# Patient Record
Sex: FEMALE | Race: WHITE | NOT HISPANIC OR LATINO
[De-identification: names, ages, dates, MRNs, and addresses within clinical notes are randomized per-mention and may not be internally consistent; named-entity substitution may affect disease eponyms.]

---

## 2017-01-06 ENCOUNTER — APPOINTMENT (OUTPATIENT)
Dept: INTERNAL MEDICINE | Facility: CLINIC | Age: 22
End: 2017-01-06

## 2017-01-09 ENCOUNTER — RESULT CHARGE (OUTPATIENT)
Age: 22
End: 2017-01-09

## 2017-01-09 ENCOUNTER — EMERGENCY (EMERGENCY)
Facility: HOSPITAL | Age: 22
LOS: 1 days | Discharge: HOME | End: 2017-01-09
Attending: EMERGENCY MEDICINE | Admitting: EMERGENCY MEDICINE
Payer: COMMERCIAL

## 2017-01-09 ENCOUNTER — APPOINTMENT (OUTPATIENT)
Dept: INTERNAL MEDICINE | Facility: CLINIC | Age: 22
End: 2017-01-09

## 2017-01-09 ENCOUNTER — APPOINTMENT (OUTPATIENT)
Dept: OBGYN | Facility: CLINIC | Age: 22
End: 2017-01-09

## 2017-01-09 VITALS
TEMPERATURE: 98 F | DIASTOLIC BLOOD PRESSURE: 62 MMHG | HEART RATE: 69 BPM | RESPIRATION RATE: 16 BRPM | SYSTOLIC BLOOD PRESSURE: 118 MMHG | OXYGEN SATURATION: 100 %

## 2017-01-09 VITALS
BODY MASS INDEX: 23.56 KG/M2 | HEIGHT: 64 IN | SYSTOLIC BLOOD PRESSURE: 102 MMHG | DIASTOLIC BLOOD PRESSURE: 70 MMHG | WEIGHT: 138 LBS

## 2017-01-09 VITALS
SYSTOLIC BLOOD PRESSURE: 117 MMHG | OXYGEN SATURATION: 95 % | RESPIRATION RATE: 18 BRPM | TEMPERATURE: 98 F | HEART RATE: 72 BPM | DIASTOLIC BLOOD PRESSURE: 80 MMHG

## 2017-01-09 VITALS
DIASTOLIC BLOOD PRESSURE: 80 MMHG | WEIGHT: 138 LBS | BODY MASS INDEX: 23.56 KG/M2 | HEIGHT: 64 IN | SYSTOLIC BLOOD PRESSURE: 130 MMHG

## 2017-01-09 DIAGNOSIS — Z86.19 PERSONAL HISTORY OF OTHER INFECTIOUS AND PARASITIC DISEASES: ICD-10-CM

## 2017-01-09 DIAGNOSIS — R11.0 NAUSEA: ICD-10-CM

## 2017-01-09 DIAGNOSIS — Z88.1 ALLERGY STATUS TO OTHER ANTIBIOTIC AGENTS STATUS: ICD-10-CM

## 2017-01-09 DIAGNOSIS — N83.20 UNSPECIFIED OVARIAN CYSTS: Chronic | ICD-10-CM

## 2017-01-09 DIAGNOSIS — R10.11 RIGHT UPPER QUADRANT PAIN: ICD-10-CM

## 2017-01-09 DIAGNOSIS — A41.9 SEPSIS, UNSPECIFIED ORGANISM: ICD-10-CM

## 2017-01-09 DIAGNOSIS — Z01.419 ENCOUNTER FOR GYNECOLOGICAL EXAMINATION (GENERAL) (ROUTINE) W/OUT ABNORMAL FINDINGS: ICD-10-CM

## 2017-01-09 DIAGNOSIS — R10.13 EPIGASTRIC PAIN: ICD-10-CM

## 2017-01-09 DIAGNOSIS — O21.0 MILD HYPEREMESIS GRAVIDARUM: ICD-10-CM

## 2017-01-09 DIAGNOSIS — Z87.891 PERSONAL HISTORY OF NICOTINE DEPENDENCE: ICD-10-CM

## 2017-01-09 DIAGNOSIS — N89.8 OTHER SPECIFIED NONINFLAMMATORY DISORDERS OF VAGINA: ICD-10-CM

## 2017-01-09 DIAGNOSIS — F15.90 OTHER STIMULANT USE, UNSPECIFIED, UNCOMPLICATED: ICD-10-CM

## 2017-01-09 DIAGNOSIS — N39.0 SEPSIS, UNSPECIFIED ORGANISM: ICD-10-CM

## 2017-01-09 DIAGNOSIS — Z3A.01 LESS THAN 8 WEEKS GESTATION OF PREGNANCY: ICD-10-CM

## 2017-01-09 LAB
ALBUMIN SERPL ELPH-MCNC: 5 G/DL — SIGNIFICANT CHANGE UP (ref 3.3–5)
ALP SERPL-CCNC: 57 U/L — SIGNIFICANT CHANGE UP (ref 40–120)
ALT FLD-CCNC: 25 U/L RC — SIGNIFICANT CHANGE UP (ref 10–45)
ANION GAP SERPL CALC-SCNC: 22 MMOL/L — HIGH (ref 5–17)
APPEARANCE UR: ABNORMAL
AST SERPL-CCNC: 28 U/L — SIGNIFICANT CHANGE UP (ref 10–40)
BACTERIA # UR AUTO: ABNORMAL /HPF
BASOPHILS # BLD AUTO: 0 K/UL — SIGNIFICANT CHANGE UP (ref 0–0.2)
BASOPHILS NFR BLD AUTO: 0.1 % — SIGNIFICANT CHANGE UP (ref 0–2)
BILIRUB SERPL-MCNC: 0.8 MG/DL — SIGNIFICANT CHANGE UP (ref 0.2–1.2)
BILIRUB UR QL STRIP: NORMAL
BILIRUB UR-MCNC: NEGATIVE — SIGNIFICANT CHANGE UP
BLD GP AB SCN SERPL QL: NEGATIVE — SIGNIFICANT CHANGE UP
BUN SERPL-MCNC: 21 MG/DL — SIGNIFICANT CHANGE UP (ref 7–23)
CALCIUM SERPL-MCNC: 10.6 MG/DL — HIGH (ref 8.4–10.5)
CHLORIDE SERPL-SCNC: 97 MMOL/L — SIGNIFICANT CHANGE UP (ref 96–108)
CLARITY UR: CLEAR
CO2 SERPL-SCNC: 18 MMOL/L — LOW (ref 22–31)
COLLECTION METHOD: NORMAL
COLOR SPEC: YELLOW — SIGNIFICANT CHANGE UP
CREAT SERPL-MCNC: 0.83 MG/DL — SIGNIFICANT CHANGE UP (ref 0.5–1.3)
DIFF PNL FLD: NEGATIVE — SIGNIFICANT CHANGE UP
EOSINOPHIL # BLD AUTO: 0 K/UL — SIGNIFICANT CHANGE UP (ref 0–0.5)
EOSINOPHIL NFR BLD AUTO: 0.3 % — SIGNIFICANT CHANGE UP (ref 0–6)
EPI CELLS # UR: SIGNIFICANT CHANGE UP /HPF
GAS PNL BLDV: SIGNIFICANT CHANGE UP
GLUCOSE SERPL-MCNC: 92 MG/DL — SIGNIFICANT CHANGE UP (ref 70–99)
GLUCOSE UR QL: NEGATIVE — SIGNIFICANT CHANGE UP
GLUCOSE UR-MCNC: NORMAL
HCG UR QL: 1 EU/DL
HCG UR QL: POSITIVE
HCT VFR BLD CALC: 43.1 % — SIGNIFICANT CHANGE UP (ref 34.5–45)
HGB BLD-MCNC: 15.2 G/DL — SIGNIFICANT CHANGE UP (ref 11.5–15.5)
HGB UR QL STRIP.AUTO: NORMAL
KETONES UR-MCNC: 40
KETONES UR-MCNC: ABNORMAL
LEUKOCYTE ESTERASE UR QL STRIP: NORMAL
LEUKOCYTE ESTERASE UR-ACNC: NEGATIVE — SIGNIFICANT CHANGE UP
LIDOCAIN IGE QN: 35 U/L — SIGNIFICANT CHANGE UP (ref 7–60)
LYMPHOCYTES # BLD AUTO: 1.2 K/UL — SIGNIFICANT CHANGE UP (ref 1–3.3)
LYMPHOCYTES # BLD AUTO: 8.4 % — LOW (ref 13–44)
MCHC RBC-ENTMCNC: 31.9 PG — SIGNIFICANT CHANGE UP (ref 27–34)
MCHC RBC-ENTMCNC: 35.3 GM/DL — SIGNIFICANT CHANGE UP (ref 32–36)
MCV RBC AUTO: 90.4 FL — SIGNIFICANT CHANGE UP (ref 80–100)
MONOCYTES # BLD AUTO: 1.1 K/UL — HIGH (ref 0–0.9)
MONOCYTES NFR BLD AUTO: 7.8 % — SIGNIFICANT CHANGE UP (ref 2–14)
NEUTROPHILS # BLD AUTO: 12.2 K/UL — HIGH (ref 1.8–7.4)
NEUTROPHILS NFR BLD AUTO: 83.4 % — HIGH (ref 43–77)
NITRITE UR QL STRIP: NORMAL
NITRITE UR-MCNC: NEGATIVE — SIGNIFICANT CHANGE UP
PH UR STRIP: 6
PH UR: 6 — SIGNIFICANT CHANGE UP (ref 4.8–8)
PLATELET # BLD AUTO: 285 K/UL — SIGNIFICANT CHANGE UP (ref 150–400)
POTASSIUM SERPL-MCNC: 3.5 MMOL/L — SIGNIFICANT CHANGE UP (ref 3.5–5.3)
POTASSIUM SERPL-SCNC: 3.5 MMOL/L — SIGNIFICANT CHANGE UP (ref 3.5–5.3)
PROT SERPL-MCNC: 8.9 G/DL — HIGH (ref 6–8.3)
PROT UR STRIP-MCNC: 30
PROT UR-MCNC: 30 MG/DL
QUALITY CONTROL: YES
RBC # BLD: 4.77 M/UL — SIGNIFICANT CHANGE UP (ref 3.8–5.2)
RBC # FLD: 12.6 % — SIGNIFICANT CHANGE UP (ref 10.3–14.5)
RBC CASTS # UR COMP ASSIST: SIGNIFICANT CHANGE UP /HPF (ref 0–2)
RH IG SCN BLD-IMP: POSITIVE — SIGNIFICANT CHANGE UP
SODIUM SERPL-SCNC: 137 MMOL/L — SIGNIFICANT CHANGE UP (ref 135–145)
SP GR SPEC: >1.03 — HIGH (ref 1.01–1.02)
SP GR UR STRIP: 1.02
UROBILINOGEN FLD QL: 1
WBC # BLD: 14.7 K/UL — HIGH (ref 3.8–10.5)
WBC # FLD AUTO: 14.7 K/UL — HIGH (ref 3.8–10.5)
WBC UR QL: SIGNIFICANT CHANGE UP /HPF (ref 0–5)

## 2017-01-09 PROCEDURE — 99284 EMERGENCY DEPT VISIT MOD MDM: CPT

## 2017-01-09 PROCEDURE — 87086 URINE CULTURE/COLONY COUNT: CPT

## 2017-01-09 PROCEDURE — 82435 ASSAY OF BLOOD CHLORIDE: CPT

## 2017-01-09 PROCEDURE — 84132 ASSAY OF SERUM POTASSIUM: CPT

## 2017-01-09 PROCEDURE — 86900 BLOOD TYPING SEROLOGIC ABO: CPT

## 2017-01-09 PROCEDURE — 96375 TX/PRO/DX INJ NEW DRUG ADDON: CPT

## 2017-01-09 PROCEDURE — 81001 URINALYSIS AUTO W/SCOPE: CPT

## 2017-01-09 PROCEDURE — 80053 COMPREHEN METABOLIC PANEL: CPT

## 2017-01-09 PROCEDURE — 96374 THER/PROPH/DIAG INJ IV PUSH: CPT

## 2017-01-09 PROCEDURE — 83690 ASSAY OF LIPASE: CPT

## 2017-01-09 PROCEDURE — 85014 HEMATOCRIT: CPT

## 2017-01-09 PROCEDURE — 86901 BLOOD TYPING SEROLOGIC RH(D): CPT

## 2017-01-09 PROCEDURE — 99284 EMERGENCY DEPT VISIT MOD MDM: CPT | Mod: 25

## 2017-01-09 PROCEDURE — 86850 RBC ANTIBODY SCREEN: CPT

## 2017-01-09 PROCEDURE — 83605 ASSAY OF LACTIC ACID: CPT

## 2017-01-09 PROCEDURE — 85027 COMPLETE CBC AUTOMATED: CPT

## 2017-01-09 PROCEDURE — 82330 ASSAY OF CALCIUM: CPT

## 2017-01-09 PROCEDURE — 87186 SC STD MICRODIL/AGAR DIL: CPT

## 2017-01-09 PROCEDURE — 82947 ASSAY GLUCOSE BLOOD QUANT: CPT

## 2017-01-09 PROCEDURE — 84295 ASSAY OF SERUM SODIUM: CPT

## 2017-01-09 PROCEDURE — 82803 BLOOD GASES ANY COMBINATION: CPT

## 2017-01-09 RX ORDER — SODIUM CHLORIDE 9 MG/ML
2000 INJECTION INTRAMUSCULAR; INTRAVENOUS; SUBCUTANEOUS ONCE
Qty: 0 | Refills: 0 | Status: COMPLETED | OUTPATIENT
Start: 2017-01-09 | End: 2017-01-09

## 2017-01-09 RX ORDER — ONDANSETRON 8 MG/1
1 TABLET, FILM COATED ORAL
Qty: 15 | Refills: 0 | OUTPATIENT
Start: 2017-01-09 | End: 2017-01-14

## 2017-01-09 RX ORDER — NITROFURANTOIN MACROCRYSTAL 50 MG
100 CAPSULE ORAL ONCE
Qty: 0 | Refills: 0 | Status: COMPLETED | OUTPATIENT
Start: 2017-01-09 | End: 2017-01-09

## 2017-01-09 RX ORDER — METOCLOPRAMIDE HCL 10 MG
10 TABLET ORAL ONCE
Qty: 0 | Refills: 0 | Status: DISCONTINUED | OUTPATIENT
Start: 2017-01-09 | End: 2017-01-09

## 2017-01-09 RX ORDER — METOCLOPRAMIDE HCL 10 MG
10 TABLET ORAL ONCE
Qty: 0 | Refills: 0 | Status: COMPLETED | OUTPATIENT
Start: 2017-01-09 | End: 2017-01-09

## 2017-01-09 RX ORDER — PYRIDOXINE HCL (VITAMIN B6) 100 MG
100 TABLET ORAL ONCE
Qty: 0 | Refills: 0 | Status: COMPLETED | OUTPATIENT
Start: 2017-01-09 | End: 2017-01-09

## 2017-01-09 RX ORDER — NITROFURANTOIN MACROCRYSTAL 50 MG
1 CAPSULE ORAL
Qty: 28 | Refills: 0 | OUTPATIENT
Start: 2017-01-09 | End: 2017-01-16

## 2017-01-09 RX ORDER — DIPHENHYDRAMINE HCL 50 MG
50 CAPSULE ORAL ONCE
Qty: 0 | Refills: 0 | Status: COMPLETED | OUTPATIENT
Start: 2017-01-09 | End: 2017-01-09

## 2017-01-09 RX ADMIN — Medication 50 MILLIGRAM(S): at 15:13

## 2017-01-09 RX ADMIN — Medication 100 MILLIGRAM(S): at 16:11

## 2017-01-09 RX ADMIN — SODIUM CHLORIDE 2000 MILLILITER(S): 9 INJECTION INTRAMUSCULAR; INTRAVENOUS; SUBCUTANEOUS at 14:48

## 2017-01-09 RX ADMIN — Medication 100 MILLIGRAM(S): at 18:04

## 2017-01-09 RX ADMIN — Medication 10 MILLIGRAM(S): at 14:59

## 2017-01-09 NOTE — ED ADULT NURSE NOTE - OBJECTIVE STATEMENT
22 y/o F, reported to ED from home. A&Ox3, c/o vomiting x1 week. Pt is 5-6 weeks pregnant and has been vomiting for the past week. Pt went to her OBGYN today and was told to go to the ED to get hydrated. Pt reports that she has been feeling weak and unable to tolerated PO. Pt reports that she vomited 2x today but hasn't eaten anything in a few days. Pt reports that even water upsets her stomach. Pt denies fever or chills. Pt denies dizziness or SOB. Pt denies pain at this time. Pt is up to date with immunizations and is being followed by OBGYN. Mother at bedside, will continue to monitor pt.

## 2017-01-09 NOTE — ED PROVIDER NOTE - CARE PLAN
Principal Discharge DX:	Hyperemesis gravidarum Principal Discharge DX:	Hyperemesis gravidarum  Instructions for follow-up, activity and diet:	1) Please return to the ED should you have any new or worsening symptoms, worsening pain, develop fever, chills, worsening nausea or vomiting  2) Please follow up with your primary care physician in 1-2 days   3) Please  your macrobid from your pharmacy - please take 1 capsule 4 times a day for 7 days. Please complete the entire prescription. Please also  zofran. Please take 1 tablet under your tongue every 8 hours as needed for nausea.  4) A urine culture is pending in the lab - please let you primary physician know to follow up with this

## 2017-01-09 NOTE — ED PROCEDURE NOTE - GENERAL PROCEDURE NAME
POCUS: Emergency Department Focused Ultrasound performed at patient's bedside.  The study was of limited quality due to bowel gas and the staging of pregnancy. Will cancel and patient with documented FHR prior to arrival today. Dr. JOE Marsh informed.

## 2017-01-09 NOTE — ED PROVIDER NOTE - PLAN OF CARE
1) Please return to the ED should you have any new or worsening symptoms, worsening pain, develop fever, chills, worsening nausea or vomiting  2) Please follow up with your primary care physician in 1-2 days   3) Please  your macrobid from your pharmacy - please take 1 capsule 4 times a day for 7 days. Please complete the entire prescription. Please also  zofran. Please take 1 tablet under your tongue every 8 hours as needed for nausea.  4) A urine culture is pending in the lab - please let you primary physician know to follow up with this

## 2017-01-09 NOTE — ED PEDIATRIC NURSE REASSESSMENT NOTE - NS ED NURSE REASSESS COMMENT FT2
RN called the pharmacist and asked to send pyridoxine medication to Peds ED. Will continue to monitor pt. Parents at bedside.

## 2017-01-09 NOTE — ED PROVIDER NOTE - OBJECTIVE STATEMENT
22 y/o female  at 6 weeks by LMP presents with emesis and abdominal pain for 1 week. Pregnancy was not planned, but patient is keeping baby. Patient states she has not been able to eat or drink 2/2 to the nausea and vomiting. Patient was at an OB Clinic today where a transvaginal ultrasound was performed and verified an IUP, and she was told to come to the ED for dehydration and nausea control. Denies any HA, F/c, vision changes, Cp, SOB, dysuria, hematuria. Reports 1 episode of very minimal spotting 5 weeks ago when she fist found she was pregnant, but none since then. No hx of abdominal surgeries. Patient has not been taking any medication for nausea at home.

## 2017-01-09 NOTE — ED PROVIDER NOTE - ABDOMINAL EXAM
nondistended/tender.../mild epigastric tenderness and right upper quadrant tenderness - negative murphys - no guarding or rebound/soft

## 2017-01-09 NOTE — ED PROVIDER NOTE - PROGRESS NOTE DETAILS
Dr. Marsh spoke with Dr. Tien Padilla at 2:30 pm. States that today in clinic a transvaginal US was performed which did not display any subchorionic hemorrhage, as patient had endorsed 1 episode of spotting during the first week of her pregnancy - Does not need repeat transvaginal, but is requesting a type and screen - okay with zofran for nausea and B6 Billy: many bacteria in urine - will d/c home with macro bid- patient has been tolerating PO - safe to d/c home with zofran rx

## 2017-01-09 NOTE — ED PROVIDER NOTE - MEDICAL DECISION MAKING DETAILS
Billy resident: 22 y/o  at 6 weeks by LMP presents with hyperemssis for 1 week - has not taken any anti-emetics for the nausea - was at OB  today (Dr. Padilla) who performed TVUS - no subchorionic - states no need for additional TVUS - would like us to rx her dehydration, type and screen, and rx nausea

## 2017-01-09 NOTE — ED ADULT NURSE REASSESSMENT NOTE - NS ED NURSE REASSESS COMMENT FT1
Pt receiving 2nd L of IV fluids. Pt reports increased nausea after drinking some apple juice. VSS. Will continue to monitor pt.

## 2017-01-09 NOTE — ED PROVIDER NOTE - ATTENDING CONTRIBUTION TO CARE
21f no pmhx  LMP 6 wks ago, IUP confirmed today sent from clinic for hyperemesis. states vomiting for ~1 wk, unable to tolerate PO. was seen at clinic today. TVUS reported to be wnl, no subchor visualized (reported 1 episode of very minimal spotting 5 weeks ago). on PE, VSS, in moderate distress, epigastrium TTP, no Warner's. will obtain basics, UA/UCx, give reglan/benadryl, reassess

## 2017-01-11 ENCOUNTER — TRANSCRIPTION ENCOUNTER (OUTPATIENT)
Age: 22
End: 2017-01-11

## 2017-01-11 LAB
-  AMIKACIN: SIGNIFICANT CHANGE UP
-  AMPICILLIN/SULBACTAM: SIGNIFICANT CHANGE UP
-  AMPICILLIN: SIGNIFICANT CHANGE UP
-  AZTREONAM: SIGNIFICANT CHANGE UP
-  CEFAZOLIN: SIGNIFICANT CHANGE UP
-  CEFEPIME: SIGNIFICANT CHANGE UP
-  CEFOXITIN: SIGNIFICANT CHANGE UP
-  CEFTAZIDIME: SIGNIFICANT CHANGE UP
-  CEFTRIAXONE: SIGNIFICANT CHANGE UP
-  CIPROFLOXACIN: SIGNIFICANT CHANGE UP
-  ERTAPENEM: SIGNIFICANT CHANGE UP
-  GENTAMICIN: SIGNIFICANT CHANGE UP
-  IMIPENEM: SIGNIFICANT CHANGE UP
-  LEVOFLOXACIN: SIGNIFICANT CHANGE UP
-  MEROPENEM: SIGNIFICANT CHANGE UP
-  NITROFURANTOIN: SIGNIFICANT CHANGE UP
-  PIPERACILLIN/TAZOBACTAM: SIGNIFICANT CHANGE UP
-  TOBRAMYCIN: SIGNIFICANT CHANGE UP
-  TRIMETHOPRIM/SULFAMETHOXAZOLE: SIGNIFICANT CHANGE UP
C TRACH DNA SPEC QL NAA+PROBE: NORMAL
C TRACH RRNA SPEC QL NAA+PROBE: NORMAL
CULTURE RESULTS: SIGNIFICANT CHANGE UP
METHOD TYPE: SIGNIFICANT CHANGE UP
N GONORRHOEA DNA SPEC QL NAA+PROBE: NORMAL
N GONORRHOEA RRNA SPEC QL NAA+PROBE: NORMAL
ORGANISM # SPEC MICROSCOPIC CNT: SIGNIFICANT CHANGE UP
ORGANISM # SPEC MICROSCOPIC CNT: SIGNIFICANT CHANGE UP
SOURCE AMPLIFICATION: NORMAL
SPECIMEN SOURCE: SIGNIFICANT CHANGE UP

## 2017-01-11 NOTE — ED POST DISCHARGE NOTE - OTHER COMMUNICATION
pregnant pt on appropriate care with macrobid. pt had called for results, gave results to pt and to pts father at pts request. father wants to know sensitivity results when available, gave him the call back number to call in about 3 days.

## 2017-01-12 ENCOUNTER — EMERGENCY (EMERGENCY)
Facility: HOSPITAL | Age: 22
LOS: 1 days | Discharge: HOME | End: 2017-01-12
Attending: EMERGENCY MEDICINE | Admitting: EMERGENCY MEDICINE
Payer: COMMERCIAL

## 2017-01-12 ENCOUNTER — APPOINTMENT (OUTPATIENT)
Dept: OBGYN | Facility: CLINIC | Age: 22
End: 2017-01-12

## 2017-01-12 VITALS
SYSTOLIC BLOOD PRESSURE: 104 MMHG | DIASTOLIC BLOOD PRESSURE: 71 MMHG | RESPIRATION RATE: 17 BRPM | OXYGEN SATURATION: 100 % | TEMPERATURE: 98 F | HEART RATE: 58 BPM

## 2017-01-12 VITALS
DIASTOLIC BLOOD PRESSURE: 64 MMHG | TEMPERATURE: 98 F | SYSTOLIC BLOOD PRESSURE: 106 MMHG | HEART RATE: 70 BPM | OXYGEN SATURATION: 100 % | RESPIRATION RATE: 18 BRPM

## 2017-01-12 DIAGNOSIS — Z88.8 ALLERGY STATUS TO OTHER DRUGS, MEDICAMENTS AND BIOLOGICAL SUBSTANCES STATUS: ICD-10-CM

## 2017-01-12 DIAGNOSIS — N83.20 UNSPECIFIED OVARIAN CYSTS: Chronic | ICD-10-CM

## 2017-01-12 DIAGNOSIS — O21.0 MILD HYPEREMESIS GRAVIDARUM: ICD-10-CM

## 2017-01-12 DIAGNOSIS — Z3A.01 LESS THAN 8 WEEKS GESTATION OF PREGNANCY: ICD-10-CM

## 2017-01-12 LAB
ALBUMIN SERPL ELPH-MCNC: 4.6 G/DL — SIGNIFICANT CHANGE UP (ref 3.3–5)
ALP SERPL-CCNC: 52 U/L — SIGNIFICANT CHANGE UP (ref 40–120)
ALT FLD-CCNC: 25 U/L RC — SIGNIFICANT CHANGE UP (ref 10–45)
ANION GAP SERPL CALC-SCNC: 18 MMOL/L — HIGH (ref 5–17)
APPEARANCE UR: ABNORMAL
AST SERPL-CCNC: 23 U/L — SIGNIFICANT CHANGE UP (ref 10–40)
BASOPHILS # BLD AUTO: 0 K/UL — SIGNIFICANT CHANGE UP (ref 0–0.2)
BASOPHILS NFR BLD AUTO: 0.3 % — SIGNIFICANT CHANGE UP (ref 0–2)
BILIRUB SERPL-MCNC: 0.5 MG/DL — SIGNIFICANT CHANGE UP (ref 0.2–1.2)
BILIRUB UR-MCNC: NEGATIVE — SIGNIFICANT CHANGE UP
BUN SERPL-MCNC: 12 MG/DL — SIGNIFICANT CHANGE UP (ref 7–23)
CALCIUM SERPL-MCNC: 9.8 MG/DL — SIGNIFICANT CHANGE UP (ref 8.4–10.5)
CHLORIDE SERPL-SCNC: 101 MMOL/L — SIGNIFICANT CHANGE UP (ref 96–108)
CO2 SERPL-SCNC: 19 MMOL/L — LOW (ref 22–31)
COLOR SPEC: YELLOW — SIGNIFICANT CHANGE UP
CREAT SERPL-MCNC: 0.67 MG/DL — SIGNIFICANT CHANGE UP (ref 0.5–1.3)
DIFF PNL FLD: NEGATIVE — SIGNIFICANT CHANGE UP
EOSINOPHIL # BLD AUTO: 0 K/UL — SIGNIFICANT CHANGE UP (ref 0–0.5)
EOSINOPHIL NFR BLD AUTO: 0.3 % — SIGNIFICANT CHANGE UP (ref 0–6)
EPI CELLS # UR: SIGNIFICANT CHANGE UP /HPF
GLUCOSE SERPL-MCNC: 80 MG/DL — SIGNIFICANT CHANGE UP (ref 70–99)
GLUCOSE UR QL: NEGATIVE — SIGNIFICANT CHANGE UP
HCG SERPL-ACNC: SIGNIFICANT CHANGE UP MIU/ML
HCT VFR BLD CALC: 41.2 % — SIGNIFICANT CHANGE UP (ref 34.5–45)
HGB BLD-MCNC: 14.4 G/DL — SIGNIFICANT CHANGE UP (ref 11.5–15.5)
KETONES UR-MCNC: ABNORMAL
LEUKOCYTE ESTERASE UR-ACNC: NEGATIVE — SIGNIFICANT CHANGE UP
LIDOCAIN IGE QN: 32 U/L — SIGNIFICANT CHANGE UP (ref 7–60)
LYMPHOCYTES # BLD AUTO: 1.6 K/UL — SIGNIFICANT CHANGE UP (ref 1–3.3)
LYMPHOCYTES # BLD AUTO: 12.6 % — LOW (ref 13–44)
MCHC RBC-ENTMCNC: 32.1 PG — SIGNIFICANT CHANGE UP (ref 27–34)
MCHC RBC-ENTMCNC: 34.9 GM/DL — SIGNIFICANT CHANGE UP (ref 32–36)
MCV RBC AUTO: 91.9 FL — SIGNIFICANT CHANGE UP (ref 80–100)
MONOCYTES # BLD AUTO: 0.8 K/UL — SIGNIFICANT CHANGE UP (ref 0–0.9)
MONOCYTES NFR BLD AUTO: 6.1 % — SIGNIFICANT CHANGE UP (ref 2–14)
NEUTROPHILS # BLD AUTO: 10.6 K/UL — HIGH (ref 1.8–7.4)
NEUTROPHILS NFR BLD AUTO: 80.7 % — HIGH (ref 43–77)
NITRITE UR-MCNC: NEGATIVE — SIGNIFICANT CHANGE UP
PH UR: 6.5 — SIGNIFICANT CHANGE UP (ref 4.8–8)
PLATELET # BLD AUTO: 223 K/UL — SIGNIFICANT CHANGE UP (ref 150–400)
POTASSIUM SERPL-MCNC: 3.4 MMOL/L — LOW (ref 3.5–5.3)
POTASSIUM SERPL-SCNC: 3.4 MMOL/L — LOW (ref 3.5–5.3)
PROT SERPL-MCNC: 8.3 G/DL — SIGNIFICANT CHANGE UP (ref 6–8.3)
PROT UR-MCNC: 100 MG/DL
RBC # BLD: 4.48 M/UL — SIGNIFICANT CHANGE UP (ref 3.8–5.2)
RBC # FLD: 13.1 % — SIGNIFICANT CHANGE UP (ref 10.3–14.5)
SODIUM SERPL-SCNC: 138 MMOL/L — SIGNIFICANT CHANGE UP (ref 135–145)
SP GR SPEC: >1.03 — HIGH (ref 1.01–1.02)
UROBILINOGEN FLD QL: 1
WBC # BLD: 13.1 K/UL — HIGH (ref 3.8–10.5)
WBC # FLD AUTO: 13.1 K/UL — HIGH (ref 3.8–10.5)
WBC UR QL: SIGNIFICANT CHANGE UP /HPF (ref 0–5)

## 2017-01-12 PROCEDURE — 99284 EMERGENCY DEPT VISIT MOD MDM: CPT

## 2017-01-12 PROCEDURE — 85027 COMPLETE CBC AUTOMATED: CPT

## 2017-01-12 PROCEDURE — 81001 URINALYSIS AUTO W/SCOPE: CPT

## 2017-01-12 PROCEDURE — 80053 COMPREHEN METABOLIC PANEL: CPT

## 2017-01-12 PROCEDURE — 83690 ASSAY OF LIPASE: CPT

## 2017-01-12 PROCEDURE — 96374 THER/PROPH/DIAG INJ IV PUSH: CPT

## 2017-01-12 PROCEDURE — 96375 TX/PRO/DX INJ NEW DRUG ADDON: CPT

## 2017-01-12 PROCEDURE — 84702 CHORIONIC GONADOTROPIN TEST: CPT

## 2017-01-12 PROCEDURE — 99284 EMERGENCY DEPT VISIT MOD MDM: CPT | Mod: 25

## 2017-01-12 RX ORDER — POTASSIUM CHLORIDE 20 MEQ
20 PACKET (EA) ORAL ONCE
Qty: 0 | Refills: 0 | Status: COMPLETED | OUTPATIENT
Start: 2017-01-12 | End: 2017-01-12

## 2017-01-12 RX ORDER — PYRIDOXINE HCL (VITAMIN B6) 100 MG
50 TABLET ORAL ONCE
Qty: 0 | Refills: 0 | Status: COMPLETED | OUTPATIENT
Start: 2017-01-12 | End: 2017-01-12

## 2017-01-12 RX ORDER — ONDANSETRON 8 MG/1
4 TABLET, FILM COATED ORAL ONCE
Qty: 0 | Refills: 0 | Status: COMPLETED | OUTPATIENT
Start: 2017-01-12 | End: 2017-01-12

## 2017-01-12 RX ORDER — DOXYLAMINE SUCCINATE AND PYRIDOXINE HYDROCHLORIDE, DELAYED RELEASE TABLETS 10 MG/10 MG 10; 10 MG/1; MG/1
1 TABLET, DELAYED RELEASE ORAL
Qty: 60 | Refills: 0 | OUTPATIENT
Start: 2017-01-12 | End: 2017-02-11

## 2017-01-12 RX ORDER — NITROFURANTOIN MACROCRYSTAL 50 MG
100 CAPSULE ORAL ONCE
Qty: 0 | Refills: 0 | Status: COMPLETED | OUTPATIENT
Start: 2017-01-12 | End: 2017-01-12

## 2017-01-12 RX ORDER — DIPHENHYDRAMINE HCL 50 MG
50 CAPSULE ORAL ONCE
Qty: 0 | Refills: 0 | Status: COMPLETED | OUTPATIENT
Start: 2017-01-12 | End: 2017-01-12

## 2017-01-12 RX ORDER — POTASSIUM CHLORIDE 20 MEQ
10 PACKET (EA) ORAL
Qty: 0 | Refills: 0 | Status: DISCONTINUED | OUTPATIENT
Start: 2017-01-12 | End: 2017-01-12

## 2017-01-12 RX ORDER — SODIUM CHLORIDE 9 MG/ML
1000 INJECTION INTRAMUSCULAR; INTRAVENOUS; SUBCUTANEOUS ONCE
Qty: 0 | Refills: 0 | Status: COMPLETED | OUTPATIENT
Start: 2017-01-12 | End: 2017-01-12

## 2017-01-12 RX ADMIN — Medication 20 MILLIEQUIVALENT(S): at 15:26

## 2017-01-12 RX ADMIN — Medication 50 MILLIGRAM(S): at 12:49

## 2017-01-12 RX ADMIN — SODIUM CHLORIDE 1000 MILLILITER(S): 9 INJECTION INTRAMUSCULAR; INTRAVENOUS; SUBCUTANEOUS at 11:24

## 2017-01-12 RX ADMIN — ONDANSETRON 4 MILLIGRAM(S): 8 TABLET, FILM COATED ORAL at 11:27

## 2017-01-12 RX ADMIN — Medication 50 MILLIGRAM(S): at 12:39

## 2017-01-12 RX ADMIN — Medication 100 MILLIGRAM(S): at 15:26

## 2017-01-12 RX ADMIN — Medication 100 MILLIEQUIVALENT(S): at 13:17

## 2017-01-12 NOTE — ED PROVIDER NOTE - OBJECTIVE STATEMENT
22yo F  6wks with hyperemesis seen in ED 2 days ago for same sx, was at OB clinic today sent to ED. lost 19pounds last week. no fever/chills. dx with UTI unable to take medication, zofran not working. no cramping, vaginal bleeding/spotting or discharge.

## 2017-01-12 NOTE — ED PROVIDER NOTE - MEDICAL DECISION MAKING DETAILS
ATTD: first trim preg with vomiting / dehydration - check labs, check US FHR, ivf, antiemetics. re eval for dispo

## 2017-01-12 NOTE — ED PROVIDER NOTE - PROGRESS NOTE DETAILS
FHR 130s by doppler -Ariana DO nausea resolved, has not vomited, will PO challenge -Slowey DO Dr. Mason: no need for GYN/OB consult since improved, D/C on diclegis -Slowey DO

## 2017-01-12 NOTE — ED PROVIDER NOTE - CARE PLAN
Principal Discharge DX:	Hyperemesis gravidarum  Instructions for follow-up, activity and diet:	1. Return to ED for worsening, progressive or any other concerning symptoms   2. Follow up with your primary care doctor in 2-3days   3. Take diclegis twice a day   4. Take 4mg Zofran every 6-8 hours as needed for nausea  5. Continue your macrobid as prescribed

## 2017-01-12 NOTE — ED PROVIDER NOTE - PHYSICAL EXAMINATION
Gen: NAD, AOx3  Head: NCAT  HEENT: PERRL, oral mucosa moist, normal conjunctiva, neck supple  Lung: CTAB, no respiratory distress  CV: rrr, no murmur, Normal perfusion  Abd: soft, NTND  MSK: No edema, no visible deformities  Neuro: No focal neurologic deficits  Skin: No rash   Psych: normal affect

## 2017-01-12 NOTE — ED PROVIDER NOTE - ATTENDING CONTRIBUTION TO CARE
20 y/o female  at 6 weeks by LMP (cant recall exact date) presents with her parents for concern of continued vomiting and unable to tolerate PO. was diagnosed with UTI as well and treated with zofran and macrobid. Was seen earlier at OB clinic and sent to ER for further eval. Had TV US with IUP and heart rate. her Gyn is Dr. Sun Mason and I spoke with earlier.   Gen. no acute distress, Non toxic   HEENT: EOMI, mmm,   Lungs: CTAB/L no C/ W /R   CVS: S1S2   Abd; Soft non tender, non distended   Ext: no edema   Neuro AAOx3 non focal clear speech

## 2017-01-12 NOTE — ED ADULT NURSE REASSESSMENT NOTE - NS ED NURSE REASSESS COMMENT FT1
as per md rodríguez, pt only to received 1 dose of IVPB Potassium Chloride 10 mEq. Pt received 20 MEQ K+ PO as per md order.

## 2017-01-12 NOTE — ED PROVIDER NOTE - PLAN OF CARE
1. Return to ED for worsening, progressive or any other concerning symptoms   2. Follow up with your primary care doctor in 2-3days   3. Take diclegis twice a day   4. Take 4mg Zofran every 6-8 hours as needed for nausea  5. Continue your macrobid as prescribed

## 2017-01-12 NOTE — ED ADULT NURSE NOTE - OBJECTIVE STATEMENT
22yo female walked into ED a+ox3, c/o abd cramping, n/v. Pt is approximately 6 weeks pregnant, sent in from OB/GYN office today for Hyperemesis Gravidum. , LMP approximately 16. Pt was seen at [facility] ED this past Monday, dx with UTI, sent home with prescription for Macrobid and Zofran ODT. Pt is unable to keep food/drink/meds down. Pt is not c/o any urinary burning/painful urination, frequency/retention. Abd pain in lower quadrant described as cramping, /10, does not radiate. Abd soft mild tenderness to palpation over suprapubic region. Pt denies diarrhea, fevers, sob, cp, chills cough, back pain, vaginal bleeding. Lung sounds clear. VSS. Parents at bedside.

## 2017-01-13 ENCOUNTER — OTHER (OUTPATIENT)
Age: 22
End: 2017-01-13

## 2017-01-15 ENCOUNTER — EMERGENCY (EMERGENCY)
Facility: HOSPITAL | Age: 22
LOS: 1 days | Discharge: HOME | End: 2017-01-15
Attending: EMERGENCY MEDICINE | Admitting: EMERGENCY MEDICINE
Payer: COMMERCIAL

## 2017-01-15 VITALS
DIASTOLIC BLOOD PRESSURE: 77 MMHG | HEART RATE: 82 BPM | TEMPERATURE: 98 F | SYSTOLIC BLOOD PRESSURE: 108 MMHG | RESPIRATION RATE: 24 BRPM | HEIGHT: 64 IN | OXYGEN SATURATION: 98 % | WEIGHT: 136.91 LBS

## 2017-01-15 VITALS
OXYGEN SATURATION: 98 % | DIASTOLIC BLOOD PRESSURE: 78 MMHG | HEART RATE: 67 BPM | SYSTOLIC BLOOD PRESSURE: 118 MMHG | TEMPERATURE: 98 F | RESPIRATION RATE: 18 BRPM

## 2017-01-15 DIAGNOSIS — N83.20 UNSPECIFIED OVARIAN CYSTS: Chronic | ICD-10-CM

## 2017-01-15 DIAGNOSIS — O21.9 VOMITING OF PREGNANCY, UNSPECIFIED: ICD-10-CM

## 2017-01-15 DIAGNOSIS — R11.0 NAUSEA: ICD-10-CM

## 2017-01-15 DIAGNOSIS — Z87.440 PERSONAL HISTORY OF URINARY (TRACT) INFECTIONS: ICD-10-CM

## 2017-01-15 DIAGNOSIS — Z88.1 ALLERGY STATUS TO OTHER ANTIBIOTIC AGENTS STATUS: ICD-10-CM

## 2017-01-15 DIAGNOSIS — Z87.19 PERSONAL HISTORY OF OTHER DISEASES OF THE DIGESTIVE SYSTEM: ICD-10-CM

## 2017-01-15 DIAGNOSIS — Z3A.01 LESS THAN 8 WEEKS GESTATION OF PREGNANCY: ICD-10-CM

## 2017-01-15 LAB
ALBUMIN SERPL ELPH-MCNC: 4.2 G/DL — SIGNIFICANT CHANGE UP (ref 3.3–5)
ALP SERPL-CCNC: 41 U/L — SIGNIFICANT CHANGE UP (ref 40–120)
ALT FLD-CCNC: 23 U/L RC — SIGNIFICANT CHANGE UP (ref 10–45)
ANION GAP SERPL CALC-SCNC: 13 MMOL/L — SIGNIFICANT CHANGE UP (ref 5–17)
ANION GAP SERPL CALC-SCNC: 14 MMOL/L — SIGNIFICANT CHANGE UP (ref 5–17)
APPEARANCE UR: ABNORMAL
AST SERPL-CCNC: 39 U/L — SIGNIFICANT CHANGE UP (ref 10–40)
BASOPHILS # BLD AUTO: 0.1 K/UL — SIGNIFICANT CHANGE UP (ref 0–0.2)
BASOPHILS NFR BLD AUTO: 0.4 % — SIGNIFICANT CHANGE UP (ref 0–2)
BILIRUB SERPL-MCNC: 0.4 MG/DL — SIGNIFICANT CHANGE UP (ref 0.2–1.2)
BILIRUB UR-MCNC: NEGATIVE — SIGNIFICANT CHANGE UP
BUN SERPL-MCNC: 10 MG/DL — SIGNIFICANT CHANGE UP (ref 7–23)
BUN SERPL-MCNC: 8 MG/DL — SIGNIFICANT CHANGE UP (ref 7–23)
CALCIUM SERPL-MCNC: 8.5 MG/DL — SIGNIFICANT CHANGE UP (ref 8.4–10.5)
CALCIUM SERPL-MCNC: 9.4 MG/DL — SIGNIFICANT CHANGE UP (ref 8.4–10.5)
CHLORIDE SERPL-SCNC: 100 MMOL/L — SIGNIFICANT CHANGE UP (ref 96–108)
CHLORIDE SERPL-SCNC: 105 MMOL/L — SIGNIFICANT CHANGE UP (ref 96–108)
CO2 SERPL-SCNC: 20 MMOL/L — LOW (ref 22–31)
CO2 SERPL-SCNC: 20 MMOL/L — LOW (ref 22–31)
COLOR SPEC: YELLOW — SIGNIFICANT CHANGE UP
CREAT SERPL-MCNC: 0.49 MG/DL — LOW (ref 0.5–1.3)
CREAT SERPL-MCNC: 0.59 MG/DL — SIGNIFICANT CHANGE UP (ref 0.5–1.3)
DIFF PNL FLD: NEGATIVE — SIGNIFICANT CHANGE UP
EOSINOPHIL # BLD AUTO: 0.1 K/UL — SIGNIFICANT CHANGE UP (ref 0–0.5)
EOSINOPHIL NFR BLD AUTO: 0.4 % — SIGNIFICANT CHANGE UP (ref 0–6)
EPI CELLS # UR: ABNORMAL /HPF
GLUCOSE SERPL-MCNC: 83 MG/DL — SIGNIFICANT CHANGE UP (ref 70–99)
GLUCOSE SERPL-MCNC: 92 MG/DL — SIGNIFICANT CHANGE UP (ref 70–99)
GLUCOSE UR QL: NEGATIVE — SIGNIFICANT CHANGE UP
HCG SERPL-ACNC: SIGNIFICANT CHANGE UP MIU/ML
HCT VFR BLD CALC: 40.1 % — SIGNIFICANT CHANGE UP (ref 34.5–45)
HGB BLD-MCNC: 14.2 G/DL — SIGNIFICANT CHANGE UP (ref 11.5–15.5)
HYALINE CASTS # UR AUTO: ABNORMAL
KETONES UR-MCNC: ABNORMAL
LEUKOCYTE ESTERASE UR-ACNC: NEGATIVE — SIGNIFICANT CHANGE UP
LYMPHOCYTES # BLD AUTO: 1.9 K/UL — SIGNIFICANT CHANGE UP (ref 1–3.3)
LYMPHOCYTES # BLD AUTO: 14.5 % — SIGNIFICANT CHANGE UP (ref 13–44)
MCHC RBC-ENTMCNC: 32.8 PG — SIGNIFICANT CHANGE UP (ref 27–34)
MCHC RBC-ENTMCNC: 35.4 GM/DL — SIGNIFICANT CHANGE UP (ref 32–36)
MCV RBC AUTO: 92.7 FL — SIGNIFICANT CHANGE UP (ref 80–100)
MONOCYTES # BLD AUTO: 0.8 K/UL — SIGNIFICANT CHANGE UP (ref 0–0.9)
MONOCYTES NFR BLD AUTO: 5.8 % — SIGNIFICANT CHANGE UP (ref 2–14)
NEUTROPHILS # BLD AUTO: 10.5 K/UL — HIGH (ref 1.8–7.4)
NEUTROPHILS NFR BLD AUTO: 78.9 % — HIGH (ref 43–77)
NITRITE UR-MCNC: NEGATIVE — SIGNIFICANT CHANGE UP
PH UR: 6.5 — SIGNIFICANT CHANGE UP (ref 4.8–8)
PLATELET # BLD AUTO: 205 K/UL — SIGNIFICANT CHANGE UP (ref 150–400)
POTASSIUM SERPL-MCNC: 3.7 MMOL/L — SIGNIFICANT CHANGE UP (ref 3.5–5.3)
POTASSIUM SERPL-MCNC: 6.6 MMOL/L — CRITICAL HIGH (ref 3.5–5.3)
POTASSIUM SERPL-SCNC: 3.7 MMOL/L — SIGNIFICANT CHANGE UP (ref 3.5–5.3)
POTASSIUM SERPL-SCNC: 6.6 MMOL/L — CRITICAL HIGH (ref 3.5–5.3)
PROT SERPL-MCNC: 8.2 G/DL — SIGNIFICANT CHANGE UP (ref 6–8.3)
PROT UR-MCNC: 30 MG/DL
RBC # BLD: 4.33 M/UL — SIGNIFICANT CHANGE UP (ref 3.8–5.2)
RBC # FLD: 13.1 % — SIGNIFICANT CHANGE UP (ref 10.3–14.5)
SODIUM SERPL-SCNC: 134 MMOL/L — LOW (ref 135–145)
SODIUM SERPL-SCNC: 138 MMOL/L — SIGNIFICANT CHANGE UP (ref 135–145)
SP GR SPEC: 1.03 — HIGH (ref 1.01–1.02)
UROBILINOGEN FLD QL: NEGATIVE — SIGNIFICANT CHANGE UP
WBC # BLD: 13.3 K/UL — HIGH (ref 3.8–10.5)
WBC # FLD AUTO: 13.3 K/UL — HIGH (ref 3.8–10.5)
WBC UR QL: SIGNIFICANT CHANGE UP /HPF (ref 0–5)

## 2017-01-15 PROCEDURE — 85027 COMPLETE CBC AUTOMATED: CPT

## 2017-01-15 PROCEDURE — 87086 URINE CULTURE/COLONY COUNT: CPT

## 2017-01-15 PROCEDURE — 80053 COMPREHEN METABOLIC PANEL: CPT

## 2017-01-15 PROCEDURE — 80048 BASIC METABOLIC PNL TOTAL CA: CPT

## 2017-01-15 PROCEDURE — 81001 URINALYSIS AUTO W/SCOPE: CPT

## 2017-01-15 PROCEDURE — 99284 EMERGENCY DEPT VISIT MOD MDM: CPT

## 2017-01-15 PROCEDURE — 96374 THER/PROPH/DIAG INJ IV PUSH: CPT

## 2017-01-15 PROCEDURE — 99284 EMERGENCY DEPT VISIT MOD MDM: CPT | Mod: 25

## 2017-01-15 PROCEDURE — 96375 TX/PRO/DX INJ NEW DRUG ADDON: CPT

## 2017-01-15 PROCEDURE — 84702 CHORIONIC GONADOTROPIN TEST: CPT

## 2017-01-15 RX ORDER — SODIUM CHLORIDE 9 MG/ML
1000 INJECTION INTRAMUSCULAR; INTRAVENOUS; SUBCUTANEOUS ONCE
Qty: 0 | Refills: 0 | Status: COMPLETED | OUTPATIENT
Start: 2017-01-15 | End: 2017-01-15

## 2017-01-15 RX ORDER — METOCLOPRAMIDE HCL 10 MG
10 TABLET ORAL ONCE
Qty: 0 | Refills: 0 | Status: COMPLETED | OUTPATIENT
Start: 2017-01-15 | End: 2017-01-15

## 2017-01-15 RX ORDER — NITROFURANTOIN MACROCRYSTAL 50 MG
100 CAPSULE ORAL ONCE
Qty: 0 | Refills: 0 | Status: COMPLETED | OUTPATIENT
Start: 2017-01-15 | End: 2017-01-15

## 2017-01-15 RX ORDER — ONDANSETRON 8 MG/1
4 TABLET, FILM COATED ORAL ONCE
Qty: 0 | Refills: 0 | Status: COMPLETED | OUTPATIENT
Start: 2017-01-15 | End: 2017-01-15

## 2017-01-15 RX ORDER — ONDANSETRON 8 MG/1
1 TABLET, FILM COATED ORAL
Qty: 12 | Refills: 0 | OUTPATIENT
Start: 2017-01-15 | End: 2017-01-19

## 2017-01-15 RX ADMIN — ONDANSETRON 4 MILLIGRAM(S): 8 TABLET, FILM COATED ORAL at 09:53

## 2017-01-15 RX ADMIN — Medication 12.5 MILLIGRAM(S): at 11:27

## 2017-01-15 RX ADMIN — Medication 100 MILLIGRAM(S): at 11:26

## 2017-01-15 RX ADMIN — SODIUM CHLORIDE 1000 MILLILITER(S): 9 INJECTION INTRAMUSCULAR; INTRAVENOUS; SUBCUTANEOUS at 09:53

## 2017-01-15 RX ADMIN — SODIUM CHLORIDE 1000 MILLILITER(S): 9 INJECTION INTRAMUSCULAR; INTRAVENOUS; SUBCUTANEOUS at 09:30

## 2017-01-15 RX ADMIN — Medication 10 MILLIGRAM(S): at 11:26

## 2017-01-15 NOTE — ED ADULT NURSE NOTE - OBJECTIVE STATEMENT
22 y/o F to ED with steady gait accompanied by parents c/o n/v, 7 weeks pregnant, unable to keep diclegis down since 1400 yesterday.  A&Ox4. +generalized weakness.  No SOB.  Lungs clear and equal to auscultation.  Easy work of breathing.  No cough.  No chest pain.  No numbness or tingling.  Abd soft round, nontender, nondistended.  +n/v, denies blood.  No diarrhea.  Pt is . Denies vaginal bleeding.  Will continue to monitor.  Family at bedside. VSS. 22 y/o F to ED with steady gait accompanied by parents c/o n/v, 7 weeks pregnant, unable to keep diclegis down since 1400 yesterday.  A&Ox4. +generalized weakness.  No SOB.  Lungs clear and equal to auscultation.  Easy work of breathing.  No cough.  No chest pain.  No numbness or tingling.  Abd soft round, nontender, nondistended.  +n/v, denies blood.  No diarrhea.  Pt on Macrobid since Monday for UTI, unable to tolerate appropriate regimen. Pt is . Denies vaginal bleeding.  Will continue to monitor.  Family at bedside. VSS.

## 2017-01-15 NOTE — ED PROVIDER NOTE - PHYSICAL EXAMINATION
ROS: GENERAL: no fevers, + chills HEENT: no epistaxis, no eye pain, no ear, no throat pain CARDIAC: no chest pain, no shortness of breath PULM: no cough, no shortness of breath GI: + nausea, + vomiting, no diarrhea, no abdominal pain, no hematemesis, no bright red blood per rectum : no dysuria, no hematuria, no vaginal bleeding EXTREMITIES: no arm pain, no leg pain, no back pain SKIN: no purpura, no petechiae NEURO: no headache, no neck pain, no loss of strength/sensation HEME: no easy bruising, no easy bleeding     PE: CONSTITUTIONAL: Well appearing, well nourished, in no apparent distress. ENMT: Airway patent, nasal mucosa clear, mouth with dry mucosa. HEAD: NCAT EYES: PERRL, EOMI CARDIAC: RRR, no m/r/g, no pedal edema RESPIRATORY: CTA b/l, no adventitious sounds GI: Abdomen non-distended, non-tender MSK: Spine appears normal, range of motion is not limited, no muscle/joint tenderness NEURO: CNII-XII grossly intact, 5/5 strength, full sensation all extremities, gait intact SKIN: Skin tone normal in color, warm and dry. No evidence of rash.

## 2017-01-15 NOTE — ED PROVIDER NOTE - PLAN OF CARE
1) Please follow-up with your ob/gyn doctor within the next 3 days.  Please call today or tomorrow for an appointment.  If you cannot follow-up with your doctor(s), please return to the ED for any urgent issues.  2) If you have any worsening of symptoms or any other concerns please return to the ED immediately.  3) Please continue taking your home medications as directed. Take the zofran and diclegis as instructed. Take the antibiotics as instructed.   4) You may have been given a copy of your labs and/or imaging.  Please go over these with your primary care doctor.

## 2017-01-15 NOTE — ED PROVIDER NOTE - PROGRESS NOTE DETAILS
PT seen and reassessed.  Patient symptomatically improved, tolerating PO intake.   AAOX3, NAD, VSS and WNL. As per Obstetrics, patient can take Zofran ODT to transition to Diclegis for better nausea control. Will discharge home with these instruction and to follow-up with Dr. Mason this week. Discussed Lab and Imaging results w/ patient, given copy of results. Patient verbalized understanding of hospital course and outpatient plans, has decisional making capacity.  Will follow-up with ob/gyn within the next 7 days; patient will call for an appointment. Will return to the ED if there is any worsening of symptoms.  - Miller Yip MD Also instructed on phenergan suppository use if vomiting persistent.  - Miller Yip MD

## 2017-01-15 NOTE — ED PROVIDER NOTE - ATTENDING CONTRIBUTION TO CARE
21 yof first pregnancy, approx 7 weeks pregnant, presents with ongoing n/v and difficulty keeping food/fluids down. has been seen in ED mult times over recent past for same. was dx with uti and started on macrobid po, but states can't keep the medication down due to ongoing vomiting. denies any specific abd pain, back pain, dysuria, fever, vag bleeding. is on diclegis po at home which she states is not helping. severity -  severe   timing - gradual    duration - weeks    location -  diffuse   aggravating factors - po intake.    ROS:   constitutional - no fever, no chills  eyes - no visual changes, no redness  eent - no sore throat, no nasal congestion  cvs - no chest pain, no leg swelling  resp - no shortness of breath, no cough  gi - no abdominal pain, + vomiting, no diarrhea  gu - no dysuria, no hematuria  msk - no acute back pain, no joint swelling  skin - no rashes, no jaundice  neuro - no headache, no focal weakness  psych - no acute mental health issue     Physical Exam:   constitutional - well appearing, awake and alert, oriented x3, minimal distress due to nausea  head - no external evidence of trauma  cvs - rrr, no murmurs, no peripheral edema  resp - breath sounds clear and equal bilat  gi - abdomen soft and nontender, no rigidity, guarding or rebound, bowel sounds present, no cva tenderness.  msk - moving all extremities spontaneously  neuro - alert and oriented x3, no focal deficits, CNs 2-12 grossly intact  skin- no jaundice, warm and dry  psych - mood and affect wnl, no apparent risk to self or others    afebrile in ED, no abd or cva tenderness, concern for worsening uti or pyelo as cause for pt's ongoing nausea/vomiting is low. given fluids, zofran and instructed on use of rectal phenergan in ED which was added to her regimen. was seen by OBGYN in ED as well. pt felt better on reassessment and was able to tolerate PO and keep her PO macrobid down. considered possibly changing abx to keflex however pt has known prior cephalosporin allergy so was not inclined to attempt a change. additional verbal instructions regarding diagnosis, return precautions and follow up plan given to pt and/or family. DORA Schmitt MD 21 yof first pregnancy, approx 7 weeks pregnant, presents with ongoing n/v and difficulty keeping food/fluids down. has been seen in ED mult times over recent past for same. was dx with uti and started on macrobid po, but states can't keep the medication down due to ongoing vomiting. denies any specific abd pain, back pain, dysuria, fever, vag bleeding. is on diclegis po at home which she states is not helping. severity -  severe   timing - gradual    duration - weeks    location -  diffuse   aggravating factors - po intake.    ROS:   constitutional - no fever, no chills  eyes - no visual changes, no redness  eent - no sore throat, no nasal congestion  cvs - no chest pain, no leg swelling  resp - no shortness of breath, no cough  gi - no abdominal pain, + vomiting, no diarrhea  gu - no dysuria, no hematuria  msk - no acute back pain, no joint swelling  skin - no rashes, no jaundice  neuro - no headache, no focal weakness  psych - no acute mental health issue     Physical Exam:   constitutional - well appearing, awake and alert, oriented x3, minimal distress due to nausea  head - no external evidence of trauma  cvs - rrr, no murmurs, no peripheral edema  resp - breath sounds clear and equal bilat  gi - abdomen soft and nontender, no rigidity, guarding or rebound, bowel sounds present, no cva tenderness.  msk - moving all extremities spontaneously  neuro - alert and oriented x3, no focal deficits, CNs 2-12 grossly intact  skin- no jaundice, warm and dry  psych - mood and affect wnl, no apparent risk to self or others    afebrile in ED, no abd or cva tenderness, concern for worsening uti or pyelo as cause for pt's ongoing nausea/vomiting is low. given fluids, zofran and instructed on use of rectal phenergan in ED which was added to her regimen. was seen by OBGYN in ED as well. pt felt better on reassessment and was able to tolerate PO and keep her PO macrobid down. considered possibly changing abx to keflex as urine culture from last week showed sensitivity to cephalosporins, however pt has known prior cephalosporin allergy so was not inclined to attempt a change. additional verbal instructions regarding diagnosis, return precautions and follow up plan given to pt and/or family. DORA Schmitt MD 21 yof first pregnancy, approx 7 weeks pregnant, presents with ongoing n/v and difficulty keeping food/fluids down. has been seen in ED mult times over recent past for same. was dx with uti and started on macrobid po, but states can't keep the medication down due to ongoing vomiting. denies any specific abd pain, back pain, dysuria, fever, vag bleeding. is on diclegis po at home which she states is not helping. severity -  severe   timing - gradual    duration - weeks    location -  diffuse   aggravating factors - po intake.    ROS:   constitutional - no fever, no chills  eyes - no visual changes, no redness  eent - no sore throat, no nasal congestion  cvs - no chest pain, no leg swelling  resp - no shortness of breath, no cough  gi - no abdominal pain, + vomiting, no diarrhea  gu - no dysuria, no hematuria  msk - no acute back pain, no joint swelling  skin - no rashes, no jaundice  neuro - no headache, no focal weakness  psych - no acute mental health issue     Physical Exam:   constitutional - well appearing, awake and alert, oriented x3, minimal distress due to nausea  head - no external evidence of trauma  cvs - rrr, no murmurs, no peripheral edema  resp - breath sounds clear and equal bilat  gi - abdomen soft and nontender, no rigidity, guarding or rebound, bowel sounds present, no cva tenderness.  msk - moving all extremities spontaneously  neuro - alert and oriented x3, no focal deficits, CNs 2-12 grossly intact  skin- no jaundice, warm and dry  psych - mood and affect wnl, no apparent risk to self or others    afebrile in ED, no abd or cva tenderness. concern for worsening uti or pyelo as cause for pt's ongoing nausea/vomiting is low. given fluids, zofran and instructed on use of rectal phenergan in ED which was added to her regimen. was seen by OBGYN in ED as well. pt felt better on reassessment and was able to tolerate PO and keep her PO macrobid down. considered possibly changing abx to keflex as urine culture from last week showed sensitivity to cephalosporins, however pt has known prior cephalosporin allergy so was not inclined to attempt a change. additional verbal instructions regarding diagnosis, return precautions and follow up plan given to pt and/or family. DORA Schmitt MD

## 2017-01-15 NOTE — ED PROVIDER NOTE - OBJECTIVE STATEMENT
21y Female  7wks pregnant complaining of vomiting. Currently on diclegis and macrobid for UTI (Macrobid since 2017). Unable to keep diclegis down, vomiting x 8 since last night at 2am. + Chills/    Obstetrics: Dr. Rae Mason (Affiliated here) 21y Female  7wks pregnant complaining of vomiting. Currently on diclegis and macrobid for UTI (Macrobid since 2017). Unable to keep diclegis down, vomiting x 8 since last night at 2am. + Chills. no vaginal discharge or bleeding.    Obstetrics: Dr. Rae Mason (Affiliated here) 21y Female  7wks pregnant complaining of vomiting. Currently on diclegis and macrobid for UTI (Macrobid since 2017). Unable to keep diclegis down, vomiting x 8 since last night at 2am. + Chills. no vaginal discharge or bleeding. Has several visits in ED for this.    Obstetrics: Dr. Rae Mason (Affiliated here)

## 2017-01-15 NOTE — ED ADULT NURSE REASSESSMENT NOTE - NS ED NURSE REASSESS COMMENT FT1
OB at bedside.
Pt educated on suppository insertion/use.  Repeat demonstration for RN preformed.
Pt given drink, family gave beverage.  Pt instructed to try to drink slow.
Pt states feeling better.  Ambulatory to bathroom with steady gait.

## 2017-01-15 NOTE — ED PROVIDER NOTE - CARE PLAN
Principal Discharge DX:	Hyperemesis arising during pregnancy  Instructions for follow-up, activity and diet:	1) Please follow-up with your ob/gyn doctor within the next 3 days.  Please call today or tomorrow for an appointment.  If you cannot follow-up with your doctor(s), please return to the ED for any urgent issues.  2) If you have any worsening of symptoms or any other concerns please return to the ED immediately.  3) Please continue taking your home medications as directed. Take the zofran and diclegis as instructed. Take the antibiotics as instructed.   4) You may have been given a copy of your labs and/or imaging.  Please go over these with your primary care doctor.

## 2017-01-16 LAB
CULTURE RESULTS: NO GROWTH — SIGNIFICANT CHANGE UP
SPECIMEN SOURCE: SIGNIFICANT CHANGE UP

## 2017-01-20 ENCOUNTER — INPATIENT (INPATIENT)
Facility: HOSPITAL | Age: 22
LOS: 4 days | Discharge: HOME | DRG: 781 | End: 2017-01-25
Attending: OBSTETRICS & GYNECOLOGY | Admitting: OBSTETRICS & GYNECOLOGY
Payer: COMMERCIAL

## 2017-01-20 ENCOUNTER — APPOINTMENT (OUTPATIENT)
Dept: OBGYN | Facility: CLINIC | Age: 22
End: 2017-01-20

## 2017-01-20 VITALS
OXYGEN SATURATION: 100 % | DIASTOLIC BLOOD PRESSURE: 71 MMHG | SYSTOLIC BLOOD PRESSURE: 107 MMHG | HEART RATE: 100 BPM | TEMPERATURE: 98 F | RESPIRATION RATE: 18 BRPM

## 2017-01-20 DIAGNOSIS — O21.0 MILD HYPEREMESIS GRAVIDARUM: ICD-10-CM

## 2017-01-20 DIAGNOSIS — N83.20 UNSPECIFIED OVARIAN CYSTS: Chronic | ICD-10-CM

## 2017-01-20 LAB
ACETONE SERPL-MCNC: NEGATIVE — SIGNIFICANT CHANGE UP
ALBUMIN SERPL ELPH-MCNC: 4.8 G/DL — SIGNIFICANT CHANGE UP (ref 3.3–5)
ALP SERPL-CCNC: 47 U/L — SIGNIFICANT CHANGE UP (ref 40–120)
ALT FLD-CCNC: 29 U/L RC — SIGNIFICANT CHANGE UP (ref 10–45)
ANION GAP SERPL CALC-SCNC: 15 MMOL/L — SIGNIFICANT CHANGE UP (ref 5–17)
APPEARANCE UR: CLEAR — SIGNIFICANT CHANGE UP
AST SERPL-CCNC: 26 U/L — SIGNIFICANT CHANGE UP (ref 10–40)
BASOPHILS # BLD AUTO: 0.1 K/UL — SIGNIFICANT CHANGE UP (ref 0–0.2)
BASOPHILS NFR BLD AUTO: 0.5 % — SIGNIFICANT CHANGE UP (ref 0–2)
BILIRUB SERPL-MCNC: 0.5 MG/DL — SIGNIFICANT CHANGE UP (ref 0.2–1.2)
BILIRUB UR-MCNC: NEGATIVE — SIGNIFICANT CHANGE UP
BUN SERPL-MCNC: 17 MG/DL — SIGNIFICANT CHANGE UP (ref 7–23)
CALCIUM SERPL-MCNC: 10.3 MG/DL — SIGNIFICANT CHANGE UP (ref 8.4–10.5)
CHLORIDE SERPL-SCNC: 98 MMOL/L — SIGNIFICANT CHANGE UP (ref 96–108)
CO2 SERPL-SCNC: 24 MMOL/L — SIGNIFICANT CHANGE UP (ref 22–31)
COLOR SPEC: YELLOW — SIGNIFICANT CHANGE UP
CREAT SERPL-MCNC: 0.65 MG/DL — SIGNIFICANT CHANGE UP (ref 0.5–1.3)
DIFF PNL FLD: NEGATIVE — SIGNIFICANT CHANGE UP
EOSINOPHIL # BLD AUTO: 0 K/UL — SIGNIFICANT CHANGE UP (ref 0–0.5)
EOSINOPHIL NFR BLD AUTO: 0.2 % — SIGNIFICANT CHANGE UP (ref 0–6)
GLUCOSE SERPL-MCNC: 99 MG/DL — SIGNIFICANT CHANGE UP (ref 70–99)
GLUCOSE UR QL: NEGATIVE — SIGNIFICANT CHANGE UP
HCG SERPL-ACNC: SIGNIFICANT CHANGE UP MIU/ML
HCT VFR BLD CALC: 43.1 % — SIGNIFICANT CHANGE UP (ref 34.5–45)
HGB BLD-MCNC: 14.3 G/DL — SIGNIFICANT CHANGE UP (ref 11.5–15.5)
KETONES UR-MCNC: ABNORMAL
LEUKOCYTE ESTERASE UR-ACNC: NEGATIVE — SIGNIFICANT CHANGE UP
LYMPHOCYTES # BLD AUTO: 15 % — SIGNIFICANT CHANGE UP (ref 13–44)
LYMPHOCYTES # BLD AUTO: 2.2 K/UL — SIGNIFICANT CHANGE UP (ref 1–3.3)
MCHC RBC-ENTMCNC: 30.6 PG — SIGNIFICANT CHANGE UP (ref 27–34)
MCHC RBC-ENTMCNC: 33.1 GM/DL — SIGNIFICANT CHANGE UP (ref 32–36)
MCV RBC AUTO: 92.3 FL — SIGNIFICANT CHANGE UP (ref 80–100)
MONOCYTES # BLD AUTO: 0.9 K/UL — SIGNIFICANT CHANGE UP (ref 0–0.9)
MONOCYTES NFR BLD AUTO: 6.3 % — SIGNIFICANT CHANGE UP (ref 2–14)
NEUTROPHILS # BLD AUTO: 11.5 K/UL — HIGH (ref 1.8–7.4)
NEUTROPHILS NFR BLD AUTO: 78 % — HIGH (ref 43–77)
NITRITE UR-MCNC: NEGATIVE — SIGNIFICANT CHANGE UP
PH UR: 6.5 — SIGNIFICANT CHANGE UP (ref 4.8–8)
PLATELET # BLD AUTO: 251 K/UL — SIGNIFICANT CHANGE UP (ref 150–400)
POTASSIUM SERPL-MCNC: 3.9 MMOL/L — SIGNIFICANT CHANGE UP (ref 3.5–5.3)
POTASSIUM SERPL-SCNC: 3.9 MMOL/L — SIGNIFICANT CHANGE UP (ref 3.5–5.3)
PROT SERPL-MCNC: 8.1 G/DL — SIGNIFICANT CHANGE UP (ref 6–8.3)
PROT UR-MCNC: 30 MG/DL
RBC # BLD: 4.67 M/UL — SIGNIFICANT CHANGE UP (ref 3.8–5.2)
RBC # FLD: 13.3 % — SIGNIFICANT CHANGE UP (ref 10.3–14.5)
SODIUM SERPL-SCNC: 137 MMOL/L — SIGNIFICANT CHANGE UP (ref 135–145)
SP GR SPEC: 1.03 — HIGH (ref 1.01–1.02)
UROBILINOGEN FLD QL: 1
WBC # BLD: 14.7 K/UL — HIGH (ref 3.8–10.5)
WBC # FLD AUTO: 14.7 K/UL — HIGH (ref 3.8–10.5)

## 2017-01-20 PROCEDURE — 99285 EMERGENCY DEPT VISIT HI MDM: CPT

## 2017-01-20 PROCEDURE — 99222 1ST HOSP IP/OBS MODERATE 55: CPT

## 2017-01-20 RX ORDER — METOCLOPRAMIDE HCL 10 MG
10 TABLET ORAL EVERY 6 HOURS
Qty: 0 | Refills: 0 | Status: DISCONTINUED | OUTPATIENT
Start: 2017-01-20 | End: 2017-01-21

## 2017-01-20 RX ORDER — FAMOTIDINE 10 MG/ML
20 INJECTION INTRAVENOUS EVERY 12 HOURS
Qty: 0 | Refills: 0 | Status: DISCONTINUED | OUTPATIENT
Start: 2017-01-20 | End: 2017-01-21

## 2017-01-20 RX ORDER — DIPHENHYDRAMINE HCL 50 MG
25 CAPSULE ORAL EVERY 12 HOURS
Qty: 0 | Refills: 0 | Status: DISCONTINUED | OUTPATIENT
Start: 2017-01-20 | End: 2017-01-25

## 2017-01-20 RX ORDER — DIPHENHYDRAMINE HCL 50 MG
25 CAPSULE ORAL ONCE
Qty: 0 | Refills: 0 | Status: COMPLETED | OUTPATIENT
Start: 2017-01-20 | End: 2017-01-21

## 2017-01-20 RX ORDER — SODIUM CHLORIDE 9 MG/ML
1000 INJECTION, SOLUTION INTRAVENOUS
Qty: 0 | Refills: 0 | Status: DISCONTINUED | OUTPATIENT
Start: 2017-01-20 | End: 2017-01-23

## 2017-01-20 RX ORDER — ONDANSETRON 8 MG/1
4 TABLET, FILM COATED ORAL EVERY 8 HOURS
Qty: 0 | Refills: 0 | Status: COMPLETED | OUTPATIENT
Start: 2017-01-20 | End: 2017-01-20

## 2017-01-20 RX ORDER — SODIUM CHLORIDE 9 MG/ML
1000 INJECTION INTRAMUSCULAR; INTRAVENOUS; SUBCUTANEOUS ONCE
Qty: 0 | Refills: 0 | Status: COMPLETED | OUTPATIENT
Start: 2017-01-20 | End: 2017-01-20

## 2017-01-20 RX ORDER — DOCUSATE SODIUM 100 MG
100 CAPSULE ORAL THREE TIMES A DAY
Qty: 0 | Refills: 0 | Status: DISCONTINUED | OUTPATIENT
Start: 2017-01-20 | End: 2017-01-25

## 2017-01-20 RX ORDER — SODIUM CHLORIDE 9 MG/ML
3 INJECTION INTRAMUSCULAR; INTRAVENOUS; SUBCUTANEOUS ONCE
Qty: 0 | Refills: 0 | Status: COMPLETED | OUTPATIENT
Start: 2017-01-20 | End: 2017-01-20

## 2017-01-20 RX ORDER — INFLUENZA VIRUS VACCINE 15; 15; 15; 15 UG/.5ML; UG/.5ML; UG/.5ML; UG/.5ML
0.5 SUSPENSION INTRAMUSCULAR ONCE
Qty: 0 | Refills: 0 | Status: DISCONTINUED | OUTPATIENT
Start: 2017-01-20 | End: 2017-01-25

## 2017-01-20 RX ORDER — PYRIDOXINE HCL (VITAMIN B6) 100 MG
50 TABLET ORAL ONCE
Qty: 0 | Refills: 0 | Status: COMPLETED | OUTPATIENT
Start: 2017-01-20 | End: 2017-01-22

## 2017-01-20 RX ORDER — PYRIDOXINE HCL (VITAMIN B6) 100 MG
25 TABLET ORAL EVERY 8 HOURS
Qty: 0 | Refills: 0 | Status: DISCONTINUED | OUTPATIENT
Start: 2017-01-20 | End: 2017-01-25

## 2017-01-20 RX ORDER — PYRIDOXINE HCL (VITAMIN B6) 100 MG
10 TABLET ORAL EVERY 6 HOURS
Qty: 0 | Refills: 0 | Status: DISCONTINUED | OUTPATIENT
Start: 2017-01-20 | End: 2017-01-20

## 2017-01-20 RX ORDER — ONDANSETRON 8 MG/1
4 TABLET, FILM COATED ORAL ONCE
Qty: 0 | Refills: 0 | Status: COMPLETED | OUTPATIENT
Start: 2017-01-20 | End: 2017-01-20

## 2017-01-20 RX ORDER — FERROUS SULFATE 325(65) MG
325 TABLET ORAL DAILY
Qty: 0 | Refills: 0 | Status: DISCONTINUED | OUTPATIENT
Start: 2017-01-20 | End: 2017-01-25

## 2017-01-20 RX ADMIN — SODIUM CHLORIDE 125 MILLILITER(S): 9 INJECTION, SOLUTION INTRAVENOUS at 19:11

## 2017-01-20 RX ADMIN — SODIUM CHLORIDE 3 MILLILITER(S): 9 INJECTION INTRAMUSCULAR; INTRAVENOUS; SUBCUTANEOUS at 17:07

## 2017-01-20 RX ADMIN — ONDANSETRON 4 MILLIGRAM(S): 8 TABLET, FILM COATED ORAL at 21:33

## 2017-01-20 RX ADMIN — ONDANSETRON 4 MILLIGRAM(S): 8 TABLET, FILM COATED ORAL at 17:07

## 2017-01-20 RX ADMIN — SODIUM CHLORIDE 1000 MILLILITER(S): 9 INJECTION INTRAMUSCULAR; INTRAVENOUS; SUBCUTANEOUS at 17:07

## 2017-01-20 RX ADMIN — Medication 10 MILLIGRAM(S): at 19:11

## 2017-01-20 NOTE — ED PROVIDER NOTE - MEDICAL DECISION MAKING DETAILS
Treat symptomatically with fluids and electrolytes, reassess. Admit for further hydration to ob gyn service.

## 2017-01-20 NOTE — ED ADULT NURSE NOTE - OBJECTIVE STATEMENT
20yo f a&ox4 ambulated into ED c/o hyperemesis, +pregnant. this is patient 3rd visit to ER for hyperemesis, states PO zofran has no effect and she is unable to tolerate any PO.

## 2017-01-20 NOTE — ED ADULT NURSE REASSESSMENT NOTE - NS ED NURSE REASSESS COMMENT FT1
Called [address] [place] [address] for report - was told to call back in 5 minutes.
Pt is in no current distress. Comfort and safety provided. Parents at bedside.

## 2017-01-20 NOTE — ED PROVIDER NOTE - ATTENDING CONTRIBUTION TO CARE
returns with persistent nausea and decreased oral intake, referred to ED for admission by OB, soft abdomen. Cheo Rosas MD (attending)

## 2017-01-20 NOTE — ED PROVIDER NOTE - OBJECTIVE STATEMENT
21 YOF presents to ED with continuous vomiting and nausea x 2 weeks, pt states after being admitted and fluid hydration she feels better but when discharged she returns back to being severely nausated. Pt has associated abd pain whenever vomiting.    OB GYN: Rae blair

## 2017-01-21 LAB
ALBUMIN SERPL ELPH-MCNC: 3.8 G/DL — SIGNIFICANT CHANGE UP (ref 3.3–5)
ALP SERPL-CCNC: 36 U/L — LOW (ref 40–120)
ALT FLD-CCNC: 21 U/L RC — SIGNIFICANT CHANGE UP (ref 10–45)
ANION GAP SERPL CALC-SCNC: 11 MMOL/L — SIGNIFICANT CHANGE UP (ref 5–17)
AST SERPL-CCNC: 17 U/L — SIGNIFICANT CHANGE UP (ref 10–40)
BASOPHILS # BLD AUTO: 0 K/UL — SIGNIFICANT CHANGE UP (ref 0–0.2)
BASOPHILS NFR BLD AUTO: 0.3 % — SIGNIFICANT CHANGE UP (ref 0–2)
BILIRUB SERPL-MCNC: 0.6 MG/DL — SIGNIFICANT CHANGE UP (ref 0.2–1.2)
BUN SERPL-MCNC: 12 MG/DL — SIGNIFICANT CHANGE UP (ref 7–23)
CALCIUM SERPL-MCNC: 9.2 MG/DL — SIGNIFICANT CHANGE UP (ref 8.4–10.5)
CHLORIDE SERPL-SCNC: 104 MMOL/L — SIGNIFICANT CHANGE UP (ref 96–108)
CO2 SERPL-SCNC: 23 MMOL/L — SIGNIFICANT CHANGE UP (ref 22–31)
CREAT SERPL-MCNC: 0.55 MG/DL — SIGNIFICANT CHANGE UP (ref 0.5–1.3)
EOSINOPHIL # BLD AUTO: 0.1 K/UL — SIGNIFICANT CHANGE UP (ref 0–0.5)
EOSINOPHIL NFR BLD AUTO: 0.5 % — SIGNIFICANT CHANGE UP (ref 0–6)
GLUCOSE SERPL-MCNC: 85 MG/DL — SIGNIFICANT CHANGE UP (ref 70–99)
HCT VFR BLD CALC: 35.8 % — SIGNIFICANT CHANGE UP (ref 34.5–45)
HGB BLD-MCNC: 12.2 G/DL — SIGNIFICANT CHANGE UP (ref 11.5–15.5)
LYMPHOCYTES # BLD AUTO: 17.3 % — SIGNIFICANT CHANGE UP (ref 13–44)
LYMPHOCYTES # BLD AUTO: 2 K/UL — SIGNIFICANT CHANGE UP (ref 1–3.3)
MCHC RBC-ENTMCNC: 31.8 PG — SIGNIFICANT CHANGE UP (ref 27–34)
MCHC RBC-ENTMCNC: 34.1 GM/DL — SIGNIFICANT CHANGE UP (ref 32–36)
MCV RBC AUTO: 93.2 FL — SIGNIFICANT CHANGE UP (ref 80–100)
MONOCYTES # BLD AUTO: 0.9 K/UL — SIGNIFICANT CHANGE UP (ref 0–0.9)
MONOCYTES NFR BLD AUTO: 7.7 % — SIGNIFICANT CHANGE UP (ref 2–14)
NEUTROPHILS # BLD AUTO: 8.6 K/UL — HIGH (ref 1.8–7.4)
NEUTROPHILS NFR BLD AUTO: 74.1 % — SIGNIFICANT CHANGE UP (ref 43–77)
PLATELET # BLD AUTO: 178 K/UL — SIGNIFICANT CHANGE UP (ref 150–400)
POTASSIUM SERPL-MCNC: 4.2 MMOL/L — SIGNIFICANT CHANGE UP (ref 3.5–5.3)
POTASSIUM SERPL-SCNC: 4.2 MMOL/L — SIGNIFICANT CHANGE UP (ref 3.5–5.3)
PROT SERPL-MCNC: 6.4 G/DL — SIGNIFICANT CHANGE UP (ref 6–8.3)
RBC # BLD: 3.84 M/UL — SIGNIFICANT CHANGE UP (ref 3.8–5.2)
RBC # FLD: 13.1 % — SIGNIFICANT CHANGE UP (ref 10.3–14.5)
SODIUM SERPL-SCNC: 138 MMOL/L — SIGNIFICANT CHANGE UP (ref 135–145)
WBC # BLD: 11.6 K/UL — HIGH (ref 3.8–10.5)
WBC # FLD AUTO: 11.6 K/UL — HIGH (ref 3.8–10.5)

## 2017-01-21 PROCEDURE — 99232 SBSQ HOSP IP/OBS MODERATE 35: CPT

## 2017-01-21 RX ORDER — DIPHENHYDRAMINE HCL 50 MG
25 CAPSULE ORAL EVERY 12 HOURS
Qty: 0 | Refills: 0 | Status: DISCONTINUED | OUTPATIENT
Start: 2017-01-21 | End: 2017-01-25

## 2017-01-21 RX ORDER — FAMOTIDINE 10 MG/ML
20 INJECTION INTRAVENOUS
Qty: 0 | Refills: 0 | Status: DISCONTINUED | OUTPATIENT
Start: 2017-01-21 | End: 2017-01-25

## 2017-01-21 RX ORDER — METOCLOPRAMIDE HCL 10 MG
10 TABLET ORAL EVERY 6 HOURS
Qty: 0 | Refills: 0 | Status: DISCONTINUED | OUTPATIENT
Start: 2017-01-21 | End: 2017-01-22

## 2017-01-21 RX ADMIN — Medication 10 MILLIGRAM(S): at 12:16

## 2017-01-21 RX ADMIN — SODIUM CHLORIDE 125 MILLILITER(S): 9 INJECTION, SOLUTION INTRAVENOUS at 12:54

## 2017-01-21 RX ADMIN — Medication 10 MILLIGRAM(S): at 06:11

## 2017-01-21 RX ADMIN — Medication 25 MILLIGRAM(S): at 06:11

## 2017-01-21 RX ADMIN — Medication 10 MILLIGRAM(S): at 23:29

## 2017-01-21 RX ADMIN — FAMOTIDINE 20 MILLIGRAM(S): 10 INJECTION INTRAVENOUS at 17:50

## 2017-01-21 RX ADMIN — FAMOTIDINE 20 MILLIGRAM(S): 10 INJECTION INTRAVENOUS at 06:11

## 2017-01-21 RX ADMIN — Medication 25 MILLIGRAM(S): at 09:17

## 2017-01-21 RX ADMIN — Medication 25 MILLIGRAM(S): at 23:29

## 2017-01-21 RX ADMIN — Medication 25 MILLIGRAM(S): at 17:50

## 2017-01-21 RX ADMIN — Medication 10 MILLIGRAM(S): at 18:24

## 2017-01-21 RX ADMIN — Medication 25 MILLIGRAM(S): at 16:22

## 2017-01-21 RX ADMIN — Medication 10 MILLIGRAM(S): at 00:39

## 2017-01-22 PROCEDURE — 99232 SBSQ HOSP IP/OBS MODERATE 35: CPT

## 2017-01-22 RX ADMIN — Medication 25 MILLIGRAM(S): at 16:37

## 2017-01-22 RX ADMIN — Medication 25 MILLIGRAM(S): at 09:11

## 2017-01-22 RX ADMIN — Medication 50 MILLIGRAM(S): at 16:37

## 2017-01-22 RX ADMIN — Medication 25 MILLIGRAM(S): at 21:34

## 2017-01-22 RX ADMIN — SODIUM CHLORIDE 125 MILLILITER(S): 9 INJECTION, SOLUTION INTRAVENOUS at 21:06

## 2017-01-22 RX ADMIN — FAMOTIDINE 20 MILLIGRAM(S): 10 INJECTION INTRAVENOUS at 05:39

## 2017-01-22 RX ADMIN — Medication 25 MILLIGRAM(S): at 00:24

## 2017-01-22 RX ADMIN — Medication 10 MILLIGRAM(S): at 05:39

## 2017-01-22 RX ADMIN — SODIUM CHLORIDE 125 MILLILITER(S): 9 INJECTION, SOLUTION INTRAVENOUS at 11:21

## 2017-01-22 RX ADMIN — Medication 10 MILLIGRAM(S): at 11:21

## 2017-01-23 ENCOUNTER — TRANSCRIPTION ENCOUNTER (OUTPATIENT)
Age: 22
End: 2017-01-23

## 2017-01-23 RX ORDER — METOCLOPRAMIDE HCL 10 MG
10 TABLET ORAL EVERY 6 HOURS
Qty: 0 | Refills: 0 | Status: DISCONTINUED | OUTPATIENT
Start: 2017-01-23 | End: 2017-01-24

## 2017-01-23 RX ORDER — METOCLOPRAMIDE HCL 10 MG
10 TABLET ORAL EVERY 6 HOURS
Qty: 0 | Refills: 0 | Status: DISCONTINUED | OUTPATIENT
Start: 2017-01-23 | End: 2017-01-23

## 2017-01-23 RX ORDER — SODIUM CHLORIDE 9 MG/ML
1000 INJECTION, SOLUTION INTRAVENOUS
Qty: 0 | Refills: 0 | Status: DISCONTINUED | OUTPATIENT
Start: 2017-01-23 | End: 2017-01-25

## 2017-01-23 RX ORDER — ONDANSETRON 8 MG/1
4 TABLET, FILM COATED ORAL EVERY 4 HOURS
Qty: 0 | Refills: 0 | Status: DISCONTINUED | OUTPATIENT
Start: 2017-01-23 | End: 2017-01-24

## 2017-01-23 RX ADMIN — Medication 25 MILLIGRAM(S): at 17:27

## 2017-01-23 RX ADMIN — Medication 100 MILLIGRAM(S): at 15:39

## 2017-01-23 RX ADMIN — SODIUM CHLORIDE 125 MILLILITER(S): 9 INJECTION, SOLUTION INTRAVENOUS at 02:43

## 2017-01-23 RX ADMIN — ONDANSETRON 4 MILLIGRAM(S): 8 TABLET, FILM COATED ORAL at 17:28

## 2017-01-23 RX ADMIN — Medication 204 MILLIGRAM(S): at 15:39

## 2017-01-23 RX ADMIN — ONDANSETRON 4 MILLIGRAM(S): 8 TABLET, FILM COATED ORAL at 20:55

## 2017-01-23 RX ADMIN — Medication 100 MILLIGRAM(S): at 22:08

## 2017-01-23 RX ADMIN — Medication 204 MILLIGRAM(S): at 08:56

## 2017-01-23 RX ADMIN — Medication 25 MILLIGRAM(S): at 08:54

## 2017-01-23 RX ADMIN — Medication 25 MILLIGRAM(S): at 22:08

## 2017-01-23 RX ADMIN — Medication 204 MILLIGRAM(S): at 20:55

## 2017-01-23 RX ADMIN — Medication 325 MILLIGRAM(S): at 15:32

## 2017-01-23 RX ADMIN — FAMOTIDINE 20 MILLIGRAM(S): 10 INJECTION INTRAVENOUS at 17:28

## 2017-01-23 NOTE — DISCHARGE NOTE ANTEPARTUM - CARE PROVIDERS DIRECT ADDRESSES
[email or web address]

## 2017-01-23 NOTE — DISCHARGE NOTE ANTEPARTUM - MEDICATION SUMMARY - MEDICATIONS TO STOP TAKING
I will STOP taking the medications listed below when I get home from the hospital:    Zofran ODT 4 mg oral tablet, disintegrating  -- 1 tab(s) by mouth every 8 hours, As Needed for nausea    Macrobid 100 mg oral capsule  -- 1 cap(s) by mouth 4 times a day  -- Finish all this medication unless otherwise directed by prescriber.  May discolor urine or feces.  Take with food or milk.    Diclegis 10 mg-10 mg oral delayed release tablet  -- 1 tab(s) by mouth 2 times a day  -- Do not chew, break, or crush.  Do not drink alcoholic beverages when taking this medication.  May cause drowsiness.  Alcohol may intensify this effect.  Use care when operating dangerous machinery.  Some non-prescription drugs may aggravate your condition.  Read all labels carefully.  If a warning appears, check with your doctor before taking.  Swallow whole.  Do not crush.  Take medication on an empty stomach 1 hour before or 2 to 3 hours after a meal unless otherwise directed by your doctor.  This drug may impair the ability to drive or operate machinery.  Use care until you become familiar with its effects.    Zofran ODT 4 mg oral tablet, disintegrating  -- 1 tab(s) by mouth 3 times a day, As Needed -for nausea    Phenergan 12.5 mg rectal suppository  -- 1 suppository(ies) rectally every 6 hours, As Needed -for nausea  -- For rectal use only.  Keep in refrigerator.  Do not freeze.  May cause drowsiness.  Alcohol may intensify this effect.  Use care when operating dangerous machinery.  Obtain medical advice before taking any non-prescription drugs as some may affect the action of this medication.

## 2017-01-23 NOTE — DISCHARGE NOTE ANTEPARTUM - MEDICATION SUMMARY - MEDICATIONS TO TAKE
I will START or STAY ON the medications listed below when I get home from the hospital:    promethazine 25 mg oral tablet  -- 1 tab(s) by mouth every 6 hours, As needed, nausea MDD:5  -- Indication: For Hyperemesis gravidarum    ondansetron 4 mg oral tablet  -- 1 tab(s) by mouth every 4 hours MDD:6  -- Indication: For Hyperemesis gravidarum    diphenhydrAMINE 25 mg oral capsule  -- 1 cap(s) by mouth every 12 hours, As needed, nausea/vomiting MDD:2  -- Indication: For Hyperemesis gravidarum    famotidine 20 mg oral tablet  -- 1 tab(s) by mouth 2 times a day MDD:2  -- Indication: For Hyperemesis gravidarum    docusate sodium 100 mg oral capsule  -- 1 cap(s) by mouth 3 times a day  -- Indication: For Hyperemesis gravidarum    pyridoxine 25 mg oral tablet  -- 1 tab(s) by mouth every 8 hours  -- Indication: For Hyperemesis gravidarum

## 2017-01-23 NOTE — DISCHARGE NOTE ANTEPARTUM - CARE PLAN
Principal Discharge DX:	Hyperemesis gravidarum  Goal:	maternal and fetal well being  Instructions for follow-up, activity and diet:	reg diet as tolerated with antiemetics prn

## 2017-01-23 NOTE — DISCHARGE NOTE ANTEPARTUM - HOSPITAL COURSE
22yo  at 8.3wks a/w hyperemesis for 3 weeks. Pt with difficulty tolerating PO meds and diet and treated with reglan, pepcid, phenergan, benadryl and Vitamin B6. Pt tolerated regular PO diet by HD#5. At time of discharge, patient ambulating, tolerating regular diet, passing flatus, voiding, pain controlled and afebrile. 20yo  at 8.3wks a/w hyperemesis for 3 weeks. Pt with difficulty tolerating PO meds and diet and treated with reglan, pepcid, phenergan, benadryl and Vitamin B6. Pt tolerated regular PO diet by HD#6. At time of discharge, patient ambulating, tolerating regular diet, passing flatus, voiding, pain controlled and afebrile.

## 2017-01-23 NOTE — DIETITIAN INITIAL EVALUATION ADULT. - OTHER INFO
Pt is a  at 8.3 weeks gestation admitted with hyperemesis. Per Pt initially gained weight (unsure of amount) but subsequently lost and now at pre-pregnancy wt. Pt c/o nausea but did tolerate cereal with milk this am. Plan is for home IVF after discharge. STACY

## 2017-01-23 NOTE — DISCHARGE NOTE ANTEPARTUM - CARE PROVIDER_API CALL
Alejandrina Moses), Obstetrics and Gynecology  [address] [place] [address]  Phone: [phone]  Fax: [phone]

## 2017-01-23 NOTE — DISCHARGE NOTE ANTEPARTUM - PATIENT PORTAL LINK FT
“You can access the Follow[employer] Patient Portal, offered by [facility], by registering with the following website: [email or web address]”

## 2017-01-24 PROCEDURE — 99232 SBSQ HOSP IP/OBS MODERATE 35: CPT

## 2017-01-24 RX ORDER — ONDANSETRON 8 MG/1
4 TABLET, FILM COATED ORAL EVERY 4 HOURS
Qty: 0 | Refills: 0 | Status: DISCONTINUED | OUTPATIENT
Start: 2017-01-24 | End: 2017-01-25

## 2017-01-24 RX ADMIN — ONDANSETRON 4 MILLIGRAM(S): 8 TABLET, FILM COATED ORAL at 21:16

## 2017-01-24 RX ADMIN — Medication 25 MILLIGRAM(S): at 18:31

## 2017-01-24 RX ADMIN — Medication 25 MILLIGRAM(S): at 01:28

## 2017-01-24 RX ADMIN — Medication 204 MILLIGRAM(S): at 02:58

## 2017-01-24 RX ADMIN — Medication 25 MILLIGRAM(S): at 21:16

## 2017-01-24 RX ADMIN — Medication 25 MILLIGRAM(S): at 18:32

## 2017-01-24 RX ADMIN — Medication 204 MILLIGRAM(S): at 09:43

## 2017-01-24 RX ADMIN — Medication 325 MILLIGRAM(S): at 13:20

## 2017-01-24 RX ADMIN — Medication 25 MILLIGRAM(S): at 09:43

## 2017-01-24 RX ADMIN — SODIUM CHLORIDE 125 MILLILITER(S): 9 INJECTION, SOLUTION INTRAVENOUS at 02:58

## 2017-01-24 RX ADMIN — FAMOTIDINE 20 MILLIGRAM(S): 10 INJECTION INTRAVENOUS at 18:31

## 2017-01-24 RX ADMIN — ONDANSETRON 4 MILLIGRAM(S): 8 TABLET, FILM COATED ORAL at 01:28

## 2017-01-24 RX ADMIN — FAMOTIDINE 20 MILLIGRAM(S): 10 INJECTION INTRAVENOUS at 05:47

## 2017-01-24 RX ADMIN — ONDANSETRON 4 MILLIGRAM(S): 8 TABLET, FILM COATED ORAL at 13:20

## 2017-01-24 RX ADMIN — ONDANSETRON 4 MILLIGRAM(S): 8 TABLET, FILM COATED ORAL at 05:47

## 2017-01-25 VITALS
TEMPERATURE: 98 F | DIASTOLIC BLOOD PRESSURE: 60 MMHG | OXYGEN SATURATION: 99 % | SYSTOLIC BLOOD PRESSURE: 94 MMHG | HEART RATE: 73 BPM | RESPIRATION RATE: 18 BRPM

## 2017-01-25 PROCEDURE — 85027 COMPLETE CBC AUTOMATED: CPT

## 2017-01-25 PROCEDURE — 81001 URINALYSIS AUTO W/SCOPE: CPT

## 2017-01-25 PROCEDURE — 84702 CHORIONIC GONADOTROPIN TEST: CPT

## 2017-01-25 PROCEDURE — 99285 EMERGENCY DEPT VISIT HI MDM: CPT | Mod: 25

## 2017-01-25 PROCEDURE — 82009 KETONE BODYS QUAL: CPT

## 2017-01-25 PROCEDURE — 96374 THER/PROPH/DIAG INJ IV PUSH: CPT

## 2017-01-25 PROCEDURE — 80053 COMPREHEN METABOLIC PANEL: CPT

## 2017-01-25 PROCEDURE — 99238 HOSP IP/OBS DSCHRG MGMT 30/<: CPT

## 2017-01-25 RX ORDER — PYRIDOXINE HCL (VITAMIN B6) 100 MG
1 TABLET ORAL
Qty: 0 | Refills: 0 | COMMUNITY
Start: 2017-01-25

## 2017-01-25 RX ORDER — DIPHENHYDRAMINE HCL 50 MG
1 CAPSULE ORAL
Qty: 30 | Refills: 0 | OUTPATIENT
Start: 2017-01-25 | End: 2017-02-09

## 2017-01-25 RX ORDER — DOCUSATE SODIUM 100 MG
1 CAPSULE ORAL
Qty: 0 | Refills: 0 | COMMUNITY
Start: 2017-01-25

## 2017-01-25 RX ORDER — ONDANSETRON 8 MG/1
1 TABLET, FILM COATED ORAL
Qty: 90 | Refills: 0 | OUTPATIENT
Start: 2017-01-25 | End: 2017-02-09

## 2017-01-25 RX ORDER — FAMOTIDINE 10 MG/ML
1 INJECTION INTRAVENOUS
Qty: 30 | Refills: 0 | OUTPATIENT
Start: 2017-01-25 | End: 2017-02-09

## 2017-01-25 RX ADMIN — FAMOTIDINE 20 MILLIGRAM(S): 10 INJECTION INTRAVENOUS at 06:00

## 2017-01-25 RX ADMIN — ONDANSETRON 4 MILLIGRAM(S): 8 TABLET, FILM COATED ORAL at 06:00

## 2017-01-25 RX ADMIN — Medication 25 MILLIGRAM(S): at 01:04

## 2017-01-25 RX ADMIN — Medication 25 MILLIGRAM(S): at 10:03

## 2017-01-25 RX ADMIN — ONDANSETRON 4 MILLIGRAM(S): 8 TABLET, FILM COATED ORAL at 10:03

## 2017-01-25 RX ADMIN — SODIUM CHLORIDE 125 MILLILITER(S): 9 INJECTION, SOLUTION INTRAVENOUS at 06:53

## 2017-01-30 ENCOUNTER — RESULT REVIEW (OUTPATIENT)
Age: 22
End: 2017-01-30

## 2017-01-31 ENCOUNTER — APPOINTMENT (OUTPATIENT)
Dept: OBGYN | Facility: CLINIC | Age: 22
End: 2017-01-31

## 2017-02-07 ENCOUNTER — APPOINTMENT (OUTPATIENT)
Dept: OBGYN | Facility: CLINIC | Age: 22
End: 2017-02-07

## 2017-02-22 ENCOUNTER — APPOINTMENT (OUTPATIENT)
Dept: OBGYN | Facility: CLINIC | Age: 22
End: 2017-02-22

## 2017-03-21 ENCOUNTER — APPOINTMENT (OUTPATIENT)
Dept: OBGYN | Facility: CLINIC | Age: 22
End: 2017-03-21

## 2017-04-19 ENCOUNTER — APPOINTMENT (OUTPATIENT)
Dept: ANTEPARTUM | Facility: CLINIC | Age: 22
End: 2017-04-19

## 2017-04-19 ENCOUNTER — ASOB RESULT (OUTPATIENT)
Age: 22
End: 2017-04-19

## 2017-04-24 ENCOUNTER — APPOINTMENT (OUTPATIENT)
Dept: OBGYN | Facility: CLINIC | Age: 22
End: 2017-04-24

## 2017-05-22 ENCOUNTER — APPOINTMENT (OUTPATIENT)
Dept: OBGYN | Facility: CLINIC | Age: 22
End: 2017-05-22

## 2017-06-08 ENCOUNTER — APPOINTMENT (OUTPATIENT)
Dept: OBGYN | Facility: CLINIC | Age: 22
End: 2017-06-08

## 2017-06-27 ENCOUNTER — APPOINTMENT (OUTPATIENT)
Dept: OBGYN | Facility: CLINIC | Age: 22
End: 2017-06-27

## 2017-07-11 ENCOUNTER — APPOINTMENT (OUTPATIENT)
Dept: OBGYN | Facility: CLINIC | Age: 22
End: 2017-07-11

## 2017-07-24 ENCOUNTER — APPOINTMENT (OUTPATIENT)
Dept: OBGYN | Facility: CLINIC | Age: 22
End: 2017-07-24

## 2017-08-08 ENCOUNTER — APPOINTMENT (OUTPATIENT)
Dept: OBGYN | Facility: CLINIC | Age: 22
End: 2017-08-08

## 2017-08-08 ENCOUNTER — APPOINTMENT (OUTPATIENT)
Dept: OBGYN | Facility: CLINIC | Age: 22
End: 2017-08-08
Payer: COMMERCIAL

## 2017-08-08 PROCEDURE — 76816 OB US FOLLOW-UP PER FETUS: CPT

## 2017-08-08 PROCEDURE — 0502F SUBSEQUENT PRENATAL CARE: CPT

## 2017-08-08 PROCEDURE — 76818 FETAL BIOPHYS PROFILE W/NST: CPT

## 2017-08-15 ENCOUNTER — APPOINTMENT (OUTPATIENT)
Dept: OBGYN | Facility: CLINIC | Age: 22
End: 2017-08-15
Payer: COMMERCIAL

## 2017-08-15 PROCEDURE — 76818 FETAL BIOPHYS PROFILE W/NST: CPT

## 2017-08-15 PROCEDURE — 76815 OB US LIMITED FETUS(S): CPT

## 2017-08-15 PROCEDURE — 0502F SUBSEQUENT PRENATAL CARE: CPT

## 2017-08-15 PROCEDURE — 76815 OB US LIMITED FETUS(S): CPT | Mod: 59

## 2017-08-22 ENCOUNTER — APPOINTMENT (OUTPATIENT)
Dept: OBGYN | Facility: CLINIC | Age: 22
End: 2017-08-22
Payer: COMMERCIAL

## 2017-08-22 PROCEDURE — 76816 OB US FOLLOW-UP PER FETUS: CPT

## 2017-08-22 PROCEDURE — 0502F SUBSEQUENT PRENATAL CARE: CPT

## 2017-08-29 ENCOUNTER — APPOINTMENT (OUTPATIENT)
Dept: OBGYN | Facility: CLINIC | Age: 22
End: 2017-08-29
Payer: COMMERCIAL

## 2017-08-29 PROCEDURE — 0502F SUBSEQUENT PRENATAL CARE: CPT

## 2017-08-29 PROCEDURE — 76818 FETAL BIOPHYS PROFILE W/NST: CPT

## 2017-09-05 ENCOUNTER — APPOINTMENT (OUTPATIENT)
Dept: OBGYN | Facility: CLINIC | Age: 22
End: 2017-09-05
Payer: COMMERCIAL

## 2017-09-05 ENCOUNTER — APPOINTMENT (OUTPATIENT)
Dept: OBGYN | Facility: CLINIC | Age: 22
End: 2017-09-05

## 2017-09-05 PROCEDURE — 76818 FETAL BIOPHYS PROFILE W/NST: CPT

## 2017-09-05 PROCEDURE — 0502F SUBSEQUENT PRENATAL CARE: CPT

## 2017-09-07 ENCOUNTER — INPATIENT (INPATIENT)
Facility: HOSPITAL | Age: 22
LOS: 1 days | Discharge: HOME | End: 2017-09-09
Attending: OBSTETRICS & GYNECOLOGY | Admitting: OBSTETRICS & GYNECOLOGY
Payer: COMMERCIAL

## 2017-09-07 VITALS — WEIGHT: 202.83 LBS

## 2017-09-07 DIAGNOSIS — O26.899 OTHER SPECIFIED PREGNANCY RELATED CONDITIONS, UNSPECIFIED TRIMESTER: ICD-10-CM

## 2017-09-07 DIAGNOSIS — Z3A.00 WEEKS OF GESTATION OF PREGNANCY NOT SPECIFIED: ICD-10-CM

## 2017-09-07 DIAGNOSIS — N83.20 UNSPECIFIED OVARIAN CYSTS: Chronic | ICD-10-CM

## 2017-09-07 DIAGNOSIS — Z34.80 ENCOUNTER FOR SUPERVISION OF OTHER NORMAL PREGNANCY, UNSPECIFIED TRIMESTER: ICD-10-CM

## 2017-09-07 LAB
BASOPHILS # BLD AUTO: 0 K/UL — SIGNIFICANT CHANGE UP (ref 0–0.2)
BASOPHILS NFR BLD AUTO: 0.2 % — SIGNIFICANT CHANGE UP (ref 0–2)
BLD GP AB SCN SERPL QL: NEGATIVE — SIGNIFICANT CHANGE UP
EOSINOPHIL # BLD AUTO: 0.1 K/UL — SIGNIFICANT CHANGE UP (ref 0–0.5)
EOSINOPHIL NFR BLD AUTO: 0.4 % — SIGNIFICANT CHANGE UP (ref 0–6)
HCT VFR BLD CALC: 29.8 % — LOW (ref 34.5–45)
HGB BLD-MCNC: 9.6 G/DL — LOW (ref 11.5–15.5)
LYMPHOCYTES # BLD AUTO: 13.9 % — SIGNIFICANT CHANGE UP (ref 13–44)
LYMPHOCYTES # BLD AUTO: 2.6 K/UL — SIGNIFICANT CHANGE UP (ref 1–3.3)
MCHC RBC-ENTMCNC: 25 PG — LOW (ref 27–34)
MCHC RBC-ENTMCNC: 32.2 GM/DL — SIGNIFICANT CHANGE UP (ref 32–36)
MCV RBC AUTO: 77.7 FL — LOW (ref 80–100)
MONOCYTES # BLD AUTO: 1.4 K/UL — HIGH (ref 0–0.9)
MONOCYTES NFR BLD AUTO: 7.3 % — SIGNIFICANT CHANGE UP (ref 2–14)
NEUTROPHILS # BLD AUTO: 14.6 K/UL — HIGH (ref 1.8–7.4)
NEUTROPHILS NFR BLD AUTO: 78.3 % — HIGH (ref 43–77)
PLATELET # BLD AUTO: 229 K/UL — SIGNIFICANT CHANGE UP (ref 150–400)
RBC # BLD: 3.83 M/UL — SIGNIFICANT CHANGE UP (ref 3.8–5.2)
RBC # FLD: 17.5 % — HIGH (ref 10.3–14.5)
RH IG SCN BLD-IMP: POSITIVE — SIGNIFICANT CHANGE UP
T PALLIDUM AB TITR SER: NEGATIVE — SIGNIFICANT CHANGE UP
WBC # BLD: 18.6 K/UL — HIGH (ref 3.8–10.5)
WBC # FLD AUTO: 18.6 K/UL — HIGH (ref 3.8–10.5)

## 2017-09-07 PROCEDURE — 59400 OBSTETRICAL CARE: CPT

## 2017-09-07 RX ORDER — OXYCODONE HYDROCHLORIDE 5 MG/1
5 TABLET ORAL
Qty: 0 | Refills: 0 | Status: DISCONTINUED | OUTPATIENT
Start: 2017-09-07 | End: 2017-09-09

## 2017-09-07 RX ORDER — OXYCODONE HYDROCHLORIDE 5 MG/1
5 TABLET ORAL
Qty: 0 | Refills: 0 | Status: DISCONTINUED | OUTPATIENT
Start: 2017-09-07 | End: 2017-09-07

## 2017-09-07 RX ORDER — AER TRAVELER 0.5 G/1
1 SOLUTION RECTAL; TOPICAL EVERY 4 HOURS
Qty: 0 | Refills: 0 | Status: DISCONTINUED | OUTPATIENT
Start: 2017-09-07 | End: 2017-09-09

## 2017-09-07 RX ORDER — SODIUM CHLORIDE 9 MG/ML
3 INJECTION INTRAMUSCULAR; INTRAVENOUS; SUBCUTANEOUS EVERY 8 HOURS
Qty: 0 | Refills: 0 | Status: DISCONTINUED | OUTPATIENT
Start: 2017-09-07 | End: 2017-09-09

## 2017-09-07 RX ORDER — IBUPROFEN 200 MG
600 TABLET ORAL EVERY 6 HOURS
Qty: 0 | Refills: 0 | Status: COMPLETED | OUTPATIENT
Start: 2017-09-07 | End: 2018-08-06

## 2017-09-07 RX ORDER — ACETAMINOPHEN 500 MG
975 TABLET ORAL EVERY 6 HOURS
Qty: 0 | Refills: 0 | Status: DISCONTINUED | OUTPATIENT
Start: 2017-09-07 | End: 2017-09-09

## 2017-09-07 RX ORDER — SODIUM CHLORIDE 9 MG/ML
3 INJECTION INTRAMUSCULAR; INTRAVENOUS; SUBCUTANEOUS EVERY 8 HOURS
Qty: 0 | Refills: 0 | Status: DISCONTINUED | OUTPATIENT
Start: 2017-09-07 | End: 2017-09-07

## 2017-09-07 RX ORDER — IBUPROFEN 200 MG
600 TABLET ORAL EVERY 6 HOURS
Qty: 0 | Refills: 0 | Status: DISCONTINUED | OUTPATIENT
Start: 2017-09-07 | End: 2017-09-09

## 2017-09-07 RX ORDER — VANCOMYCIN HCL 1 G
1000 VIAL (EA) INTRAVENOUS ONCE
Qty: 0 | Refills: 0 | Status: COMPLETED | OUTPATIENT
Start: 2017-09-07 | End: 2017-09-07

## 2017-09-07 RX ORDER — CITRIC ACID/SODIUM CITRATE 300-500 MG
15 SOLUTION, ORAL ORAL EVERY 4 HOURS
Qty: 0 | Refills: 0 | Status: DISCONTINUED | OUTPATIENT
Start: 2017-09-07 | End: 2017-09-07

## 2017-09-07 RX ORDER — SODIUM CHLORIDE 9 MG/ML
500 INJECTION, SOLUTION INTRAVENOUS ONCE
Qty: 0 | Refills: 0 | Status: COMPLETED | OUTPATIENT
Start: 2017-09-07 | End: 2017-09-07

## 2017-09-07 RX ORDER — SODIUM CHLORIDE 9 MG/ML
1000 INJECTION, SOLUTION INTRAVENOUS
Qty: 0 | Refills: 0 | Status: DISCONTINUED | OUTPATIENT
Start: 2017-09-07 | End: 2017-09-07

## 2017-09-07 RX ORDER — TETANUS TOXOID, REDUCED DIPHTHERIA TOXOID AND ACELLULAR PERTUSSIS VACCINE, ADSORBED 5; 2.5; 8; 8; 2.5 [IU]/.5ML; [IU]/.5ML; UG/.5ML; UG/.5ML; UG/.5ML
0.5 SUSPENSION INTRAMUSCULAR ONCE
Qty: 0 | Refills: 0 | Status: DISCONTINUED | OUTPATIENT
Start: 2017-09-07 | End: 2017-09-09

## 2017-09-07 RX ORDER — OXYTOCIN 10 UNIT/ML
4 VIAL (ML) INJECTION
Qty: 30 | Refills: 0 | Status: DISCONTINUED | OUTPATIENT
Start: 2017-09-07 | End: 2017-09-07

## 2017-09-07 RX ORDER — DIBUCAINE 1 %
1 OINTMENT (GRAM) RECTAL EVERY 4 HOURS
Qty: 0 | Refills: 0 | Status: DISCONTINUED | OUTPATIENT
Start: 2017-09-07 | End: 2017-09-07

## 2017-09-07 RX ORDER — DIPHENHYDRAMINE HCL 50 MG
25 CAPSULE ORAL EVERY 6 HOURS
Qty: 0 | Refills: 0 | Status: DISCONTINUED | OUTPATIENT
Start: 2017-09-07 | End: 2017-09-09

## 2017-09-07 RX ORDER — VANCOMYCIN HCL 1 G
1000 VIAL (EA) INTRAVENOUS EVERY 12 HOURS
Qty: 0 | Refills: 0 | Status: DISCONTINUED | OUTPATIENT
Start: 2017-09-07 | End: 2017-09-07

## 2017-09-07 RX ORDER — KETOROLAC TROMETHAMINE 30 MG/ML
30 SYRINGE (ML) INJECTION ONCE
Qty: 0 | Refills: 0 | Status: DISCONTINUED | OUTPATIENT
Start: 2017-09-07 | End: 2017-09-07

## 2017-09-07 RX ORDER — PRAMOXINE HYDROCHLORIDE 150 MG/15G
1 AEROSOL, FOAM RECTAL EVERY 4 HOURS
Qty: 0 | Refills: 0 | Status: DISCONTINUED | OUTPATIENT
Start: 2017-09-07 | End: 2017-09-07

## 2017-09-07 RX ORDER — DIPHENHYDRAMINE HCL 50 MG
25 CAPSULE ORAL ONCE
Qty: 0 | Refills: 0 | Status: COMPLETED | OUTPATIENT
Start: 2017-09-07 | End: 2017-09-07

## 2017-09-07 RX ORDER — AER TRAVELER 0.5 G/1
1 SOLUTION RECTAL; TOPICAL EVERY 4 HOURS
Qty: 0 | Refills: 0 | Status: DISCONTINUED | OUTPATIENT
Start: 2017-09-07 | End: 2017-09-07

## 2017-09-07 RX ORDER — OXYCODONE HYDROCHLORIDE 5 MG/1
5 TABLET ORAL EVERY 4 HOURS
Qty: 0 | Refills: 0 | Status: DISCONTINUED | OUTPATIENT
Start: 2017-09-07 | End: 2017-09-07

## 2017-09-07 RX ORDER — HYDROCORTISONE 1 %
1 OINTMENT (GRAM) TOPICAL EVERY 4 HOURS
Qty: 0 | Refills: 0 | Status: DISCONTINUED | OUTPATIENT
Start: 2017-09-07 | End: 2017-09-09

## 2017-09-07 RX ORDER — OXYTOCIN 10 UNIT/ML
41.67 VIAL (ML) INJECTION
Qty: 20 | Refills: 0 | Status: DISCONTINUED | OUTPATIENT
Start: 2017-09-07 | End: 2017-09-07

## 2017-09-07 RX ORDER — VANCOMYCIN HCL 1 G
VIAL (EA) INTRAVENOUS
Qty: 0 | Refills: 0 | Status: DISCONTINUED | OUTPATIENT
Start: 2017-09-07 | End: 2017-09-07

## 2017-09-07 RX ORDER — DOCUSATE SODIUM 100 MG
100 CAPSULE ORAL
Qty: 0 | Refills: 0 | Status: DISCONTINUED | OUTPATIENT
Start: 2017-09-07 | End: 2017-09-08

## 2017-09-07 RX ORDER — HYDROCORTISONE 1 %
1 OINTMENT (GRAM) TOPICAL EVERY 4 HOURS
Qty: 0 | Refills: 0 | Status: DISCONTINUED | OUTPATIENT
Start: 2017-09-07 | End: 2017-09-07

## 2017-09-07 RX ORDER — PRAMOXINE HYDROCHLORIDE 150 MG/15G
1 AEROSOL, FOAM RECTAL EVERY 4 HOURS
Qty: 0 | Refills: 0 | Status: DISCONTINUED | OUTPATIENT
Start: 2017-09-07 | End: 2017-09-09

## 2017-09-07 RX ORDER — GLYCERIN ADULT
1 SUPPOSITORY, RECTAL RECTAL AT BEDTIME
Qty: 0 | Refills: 0 | Status: DISCONTINUED | OUTPATIENT
Start: 2017-09-07 | End: 2017-09-09

## 2017-09-07 RX ORDER — LANOLIN
1 OINTMENT (GRAM) TOPICAL EVERY 6 HOURS
Qty: 0 | Refills: 0 | Status: DISCONTINUED | OUTPATIENT
Start: 2017-09-07 | End: 2017-09-09

## 2017-09-07 RX ORDER — KETOROLAC TROMETHAMINE 30 MG/ML
30 SYRINGE (ML) INJECTION ONCE
Qty: 0 | Refills: 0 | Status: DISCONTINUED | OUTPATIENT
Start: 2017-09-07 | End: 2017-09-09

## 2017-09-07 RX ORDER — ACETAMINOPHEN 500 MG
975 TABLET ORAL EVERY 6 HOURS
Qty: 0 | Refills: 0 | Status: COMPLETED | OUTPATIENT
Start: 2017-09-07 | End: 2018-08-06

## 2017-09-07 RX ORDER — OXYTOCIN 10 UNIT/ML
41.67 VIAL (ML) INJECTION
Qty: 20 | Refills: 0 | Status: DISCONTINUED | OUTPATIENT
Start: 2017-09-07 | End: 2017-09-09

## 2017-09-07 RX ORDER — DIBUCAINE 1 %
1 OINTMENT (GRAM) RECTAL EVERY 4 HOURS
Qty: 0 | Refills: 0 | Status: DISCONTINUED | OUTPATIENT
Start: 2017-09-07 | End: 2017-09-09

## 2017-09-07 RX ORDER — INFLUENZA VIRUS VACCINE 15; 15; 15; 15 UG/.5ML; UG/.5ML; UG/.5ML; UG/.5ML
0.5 SUSPENSION INTRAMUSCULAR ONCE
Qty: 0 | Refills: 0 | Status: DISCONTINUED | OUTPATIENT
Start: 2017-09-07 | End: 2017-09-07

## 2017-09-07 RX ORDER — OXYCODONE HYDROCHLORIDE 5 MG/1
5 TABLET ORAL EVERY 4 HOURS
Qty: 0 | Refills: 0 | Status: DISCONTINUED | OUTPATIENT
Start: 2017-09-07 | End: 2017-09-09

## 2017-09-07 RX ORDER — SIMETHICONE 80 MG/1
80 TABLET, CHEWABLE ORAL EVERY 6 HOURS
Qty: 0 | Refills: 0 | Status: DISCONTINUED | OUTPATIENT
Start: 2017-09-07 | End: 2017-09-09

## 2017-09-07 RX ORDER — OXYTOCIN 10 UNIT/ML
333.33 VIAL (ML) INJECTION
Qty: 20 | Refills: 0 | Status: DISCONTINUED | OUTPATIENT
Start: 2017-09-07 | End: 2017-09-07

## 2017-09-07 RX ORDER — ACETAMINOPHEN 500 MG
975 TABLET ORAL EVERY 6 HOURS
Qty: 0 | Refills: 0 | Status: DISCONTINUED | OUTPATIENT
Start: 2017-09-07 | End: 2017-09-07

## 2017-09-07 RX ORDER — MAGNESIUM HYDROXIDE 400 MG/1
30 TABLET, CHEWABLE ORAL
Qty: 0 | Refills: 0 | Status: DISCONTINUED | OUTPATIENT
Start: 2017-09-07 | End: 2017-09-09

## 2017-09-07 RX ADMIN — Medication 4 MILLIUNIT(S)/MIN: at 11:10

## 2017-09-07 RX ADMIN — Medication 25 MILLIGRAM(S): at 07:05

## 2017-09-07 RX ADMIN — SODIUM CHLORIDE 125 MILLILITER(S): 9 INJECTION, SOLUTION INTRAVENOUS at 05:45

## 2017-09-07 RX ADMIN — Medication 250 MILLIGRAM(S): at 06:04

## 2017-09-07 RX ADMIN — SODIUM CHLORIDE 125 MILLILITER(S): 9 INJECTION, SOLUTION INTRAVENOUS at 07:30

## 2017-09-07 RX ADMIN — SODIUM CHLORIDE 1000 MILLILITER(S): 9 INJECTION, SOLUTION INTRAVENOUS at 05:45

## 2017-09-08 VITALS
TEMPERATURE: 98 F | RESPIRATION RATE: 18 BRPM | OXYGEN SATURATION: 97 % | HEART RATE: 81 BPM | SYSTOLIC BLOOD PRESSURE: 124 MMHG | DIASTOLIC BLOOD PRESSURE: 79 MMHG

## 2017-09-08 LAB
HCT VFR BLD CALC: 25.7 % — LOW (ref 34.5–45)
HGB BLD-MCNC: 7.8 G/DL — LOW (ref 11.5–15.5)

## 2017-09-08 RX ORDER — DOCUSATE SODIUM 100 MG
100 CAPSULE ORAL THREE TIMES A DAY
Qty: 0 | Refills: 0 | Status: DISCONTINUED | OUTPATIENT
Start: 2017-09-08 | End: 2017-09-09

## 2017-09-08 RX ORDER — ASCORBIC ACID 60 MG
250 TABLET,CHEWABLE ORAL THREE TIMES A DAY
Qty: 0 | Refills: 0 | Status: DISCONTINUED | OUTPATIENT
Start: 2017-09-08 | End: 2017-09-09

## 2017-09-08 RX ORDER — FERROUS SULFATE 325(65) MG
325 TABLET ORAL
Qty: 0 | Refills: 0 | Status: DISCONTINUED | OUTPATIENT
Start: 2017-09-08 | End: 2017-09-09

## 2017-09-08 RX ADMIN — Medication 250 MILLIGRAM(S): at 15:27

## 2017-09-08 RX ADMIN — Medication 325 MILLIGRAM(S): at 18:36

## 2017-09-08 RX ADMIN — Medication 100 MILLIGRAM(S): at 21:33

## 2017-09-08 RX ADMIN — Medication 600 MILLIGRAM(S): at 14:00

## 2017-09-08 RX ADMIN — Medication 600 MILLIGRAM(S): at 18:36

## 2017-09-08 RX ADMIN — Medication 100 MILLIGRAM(S): at 15:27

## 2017-09-08 RX ADMIN — Medication 600 MILLIGRAM(S): at 13:07

## 2017-09-08 NOTE — CHART NOTE - NSCHARTNOTEFT_GEN_A_CORE
PA NOTE     Day 1       Vital Signs Last 24 Hrs  T(C): 36.8 (08 Sep 2017 09:00), Max: 36.9 (07 Sep 2017 17:45)  T(F): 98.2 (08 Sep 2017 09:00), Max: 98.5 (07 Sep 2017 17:45)  HR: 96 (08 Sep 2017 09:00) (76 - 102)  BP: 131/77 (08 Sep 2017 09:00) (106/57 - 133/69)  BP(mean): 79 (07 Sep 2017 15:35) (76 - 82)  RR: 18 (08 Sep 2017 09:00) (18 - 22)  SpO2: 98% (07 Sep 2017 17:45) (94% - 98%)                          7.8    x     )-----------( x        ( 08 Sep 2017 07:08 )             25.7           Plan:  - Ferrous Sulfate, Colace, Vitamin C supplementation.  - Monitor for signs/symptoms of anemia.     Yaneth Mackey PA-C

## 2017-09-08 NOTE — PROGRESS NOTE ADULT - SUBJECTIVE AND OBJECTIVE BOX
PA Postpartum Note- PPD#1    Prenatal Labs  Blood type: A Positive  Rubella IgG: Immune  RPR: Negative        S:Patient w/o complaints, pain is controlled.    Pt is OOB, tolerating PO, voiding. Lochia WNL.     O:  Vital Signs Last 24 Hrs  T(C): 36.9 (08 Sep 2017 05:00), Max: 36.9 (07 Sep 2017 17:45)  T(F): 98.4 (08 Sep 2017 05:00), Max: 98.5 (07 Sep 2017 17:45)  HR: 93 (08 Sep 2017 05:00) (76 - 102)  BP: 110/66 (08 Sep 2017 05:00) (106/57 - 133/69)  BP(mean): 79 (07 Sep 2017 15:35) (76 - 82)  RR: 18 (08 Sep 2017 05:00) (18 - 22)  SpO2: 98% (07 Sep 2017 17:45) (94% - 98%)     Gen: NAD  Abdomen: Soft, nontender, non-distended, fundus firm.  Vaginal: Lochia WNL,    Ext: Neg edema, Neg calf tenderness    LABS:    Hemoglobin: 9.6 g/dL (09-07 @ 05:38)      Hematocrit: 29.8 % (09-07 @ 05:38)

## 2017-09-08 NOTE — PROGRESS NOTE ADULT - ASSESSMENT
A/P:  21y GP PPD # 1   S/P  with 1st degree laceration  Doing Well    PMHx: none  Current Issues: none

## 2017-09-09 ENCOUNTER — TRANSCRIPTION ENCOUNTER (OUTPATIENT)
Age: 22
End: 2017-09-09

## 2017-09-09 PROCEDURE — 86780 TREPONEMA PALLIDUM: CPT

## 2017-09-09 PROCEDURE — 59025 FETAL NON-STRESS TEST: CPT

## 2017-09-09 PROCEDURE — 86901 BLOOD TYPING SEROLOGIC RH(D): CPT

## 2017-09-09 PROCEDURE — 59050 FETAL MONITOR W/REPORT: CPT

## 2017-09-09 PROCEDURE — 85027 COMPLETE CBC AUTOMATED: CPT

## 2017-09-09 PROCEDURE — G0463: CPT

## 2017-09-09 PROCEDURE — 85018 HEMOGLOBIN: CPT

## 2017-09-09 PROCEDURE — 86850 RBC ANTIBODY SCREEN: CPT

## 2017-09-09 PROCEDURE — 86900 BLOOD TYPING SEROLOGIC ABO: CPT

## 2017-09-09 PROCEDURE — 90686 IIV4 VACC NO PRSV 0.5 ML IM: CPT

## 2017-09-09 RX ORDER — ASCORBIC ACID 60 MG
1 TABLET,CHEWABLE ORAL
Qty: 0 | Refills: 0 | DISCHARGE
Start: 2017-09-09

## 2017-09-09 RX ORDER — FERROUS SULFATE 325(65) MG
1 TABLET ORAL
Qty: 90 | Refills: 0
Start: 2017-09-09 | End: 2017-10-09

## 2017-09-09 RX ORDER — INFLUENZA VIRUS VACCINE 15; 15; 15; 15 UG/.5ML; UG/.5ML; UG/.5ML; UG/.5ML
0.5 SUSPENSION INTRAMUSCULAR ONCE
Qty: 0 | Refills: 0 | Status: COMPLETED | OUTPATIENT
Start: 2017-09-09 | End: 2017-09-09

## 2017-09-09 RX ORDER — ACETAMINOPHEN 500 MG
3 TABLET ORAL
Qty: 0 | Refills: 0 | DISCHARGE
Start: 2017-09-09

## 2017-09-09 RX ORDER — IBUPROFEN 200 MG
1 TABLET ORAL
Qty: 0 | Refills: 0 | DISCHARGE
Start: 2017-09-09

## 2017-09-09 RX ADMIN — Medication 600 MILLIGRAM(S): at 12:03

## 2017-09-09 RX ADMIN — Medication 600 MILLIGRAM(S): at 13:00

## 2017-09-09 RX ADMIN — INFLUENZA VIRUS VACCINE 0.5 MILLILITER(S): 15; 15; 15; 15 SUSPENSION INTRAMUSCULAR at 07:11

## 2017-09-09 RX ADMIN — Medication 600 MILLIGRAM(S): at 06:10

## 2017-09-09 RX ADMIN — Medication 600 MILLIGRAM(S): at 00:35

## 2017-09-09 NOTE — DISCHARGE NOTE OB - MEDICATION SUMMARY - MEDICATIONS TO STOP TAKING
I will STOP taking the medications listed below when I get home from the hospital:    promethazine 25 mg oral tablet  -- 1 tab(s) by mouth every 6 hours, As needed, nausea MDD:5    ondansetron 4 mg oral tablet  -- 1 tab(s) by mouth every 4 hours MDD:6    diphenhydrAMINE 25 mg oral capsule  -- 1 cap(s) by mouth every 12 hours, As needed, nausea/vomiting MDD:2    pyridoxine 25 mg oral tablet  -- 1 tab(s) by mouth every 8 hours

## 2017-09-09 NOTE — DISCHARGE NOTE OB - MEDICATION SUMMARY - MEDICATIONS TO TAKE
I will START or STAY ON the medications listed below when I get home from the hospital:    acetaminophen 325 mg oral tablet  -- 3 tab(s) by mouth every 6 hours  -- Indication: For  (normal spontaneous vaginal delivery)    ibuprofen 600 mg oral tablet  -- 1 tab(s) by mouth every 6 hours  -- Indication: For  (normal spontaneous vaginal delivery)    ferrous sulfate 325 mg (65 mg elemental iron) oral tablet  -- 1 tab(s) by mouth 3 times a day  -- Indication: For Anemia    ascorbic acid 250 mg oral tablet  -- 1 tab(s) by mouth 3 times a day  -- Indication: For Anemia

## 2017-09-09 NOTE — DISCHARGE NOTE OB - CARE PROVIDER_API CALL
Stefanie Damian (DO), Obstetrics and Gynecology  [address] [place] [id number]  Phone: [phone]  Fax: [phone]

## 2017-09-09 NOTE — DISCHARGE NOTE OB - HOSPITAL COURSE
Pt admitted for induction of labor due to low BPP score. She had an uncomplicated  and postpartum course. She was discharged home on PPD@ meeting all milestones.

## 2017-09-09 NOTE — PROGRESS NOTE ADULT - SUBJECTIVE AND OBJECTIVE BOX
PPD#2- ATTENDING NOTE    S: Patient doing well. Minimal lochia. Pain controlled. Voiding without difficulty.    O: Vital Signs Last 24 Hrs  T(C): 36.7 (08 Sep 2017 19:38), Max: 37 (08 Sep 2017 18:00)  T(F): 98.1 (08 Sep 2017 19:38), Max: 98.6 (08 Sep 2017 18:00)  HR: 81 (08 Sep 2017 19:38) (81 - 96)  BP: 124/79 (08 Sep 2017 19:38) (117/78 - 124/79)  BP(mean): --  RR: 18 (08 Sep 2017 19:38) (18 - 18)  SpO2: 97% (08 Sep 2017 19:38) (97% - 97%)    Gen: NAD  Abd: soft, NT, ND, fundus firm below umbilicus  Lochia: moderate  Ext: no calf tenderdess    Labs:                        7.8    x     )-----------( x        ( 08 Sep 2017 07:08 )             25.7       A: 21y PPD# 2 s/p  doing well. Pt with asymptomatic anemia.     Plan:  Iron & colace TID  Analgesia prn  Regular diet  Discharge instruction given  F/U 6 weeks

## 2017-09-09 NOTE — DISCHARGE NOTE OB - CARE PLAN
Principal Discharge DX:	 (normal spontaneous vaginal delivery)  Goal:	Rest and recovery  Instructions for follow-up, activity and diet:	Resume normal diet as tolerated, no intercourse fo 6 weeks  Secondary Diagnosis:	Gastritis without bleeding, unspecified chronicity, unspecified gastritis type

## 2017-09-09 NOTE — DISCHARGE NOTE OB - PATIENT PORTAL LINK FT
“You can access the FollowHealth Patient Portal, offered by [facility], by registering with the following website: [email or web address]”

## 2017-10-16 ENCOUNTER — APPOINTMENT (OUTPATIENT)
Dept: OBGYN | Facility: CLINIC | Age: 22
End: 2017-10-16
Payer: COMMERCIAL

## 2017-10-16 PROCEDURE — 99213 OFFICE O/P EST LOW 20 MIN: CPT

## 2018-02-04 NOTE — PATIENT PROFILE OB - NSSTREETDRUGTY_GEN_ALL_CORE_SD
(0) no particular expression or smile/(0) no cry (awake or asleep)/(0) content, relaxed/(0) lying quietly, normal position, moves easily/(0) normal position or relaxed marijuana

## 2019-03-15 NOTE — DISCHARGE NOTE OB - PROVIDER RX CONTACT NUMBER
Detail Level: Simple Include Z78.9 (Other Specified Conditions Influencing Health Status) As An Associated Diagnosis?: No X Size Of Lesion In Cm (Optional): 0 Kenalog Preparation: Kenalog Total Volume Injected (Ccs- Only Use Numbers And Decimals): 0.05 Medical Necessity Clause: This procedure was medically necessary because the lesions that were treated were: Consent: The risks of atrophy were reviewed with the patient. Concentration Of Solution Injected (Mg/Ml): 40.0 [phone]

## 2020-01-01 NOTE — PATIENT PROFILE OB - CAREGIVER PHONE NUMBER
DISPLAY PLAN FREE TEXT DISPLAY PLAN FREE TEXT DISPLAY PLAN FREE TEXT DISPLAY PLAN FREE TEXT DISPLAY PLAN FREE TEXT DISPLAY PLAN FREE TEXT DISPLAY PLAN FREE TEXT DISPLAY PLAN FREE TEXT DISPLAY PLAN FREE TEXT DISPLAY PLAN FREE TEXT DISPLAY PLAN FREE TEXT DISPLAY PLAN FREE TEXT DISPLAY PLAN FREE TEXT DISPLAY PLAN FREE TEXT same as above

## 2020-07-09 NOTE — ED ADULT TRIAGE NOTE - ACCOMPANIED BY
Spoke with patient about restarting Allergy shots.  She declined to restart.     Katie Villarreal MA, [facility] ......7/9/2020...11:14 AM    
Immediate family member

## 2020-07-29 NOTE — ED PROVIDER NOTE - TEMPLATE, MLM
Care Everywhere: updated  Immunization: updated  Health Maintenance: updated  Media Review:   Legacy Review:   Order placed:   Upcoming appts:          
Abdominal Pain, N/V/D

## 2020-09-30 ENCOUNTER — TRANSCRIPTION ENCOUNTER (OUTPATIENT)
Age: 25
End: 2020-09-30

## 2020-10-15 NOTE — PATIENT PROFILE OB - ABORTIONS, OB PROFILE
Airway    Date/Time: 10/15/2020 10:13 AM  Performed by: Ej Moreno M.D.  Authorized by: Ej Moreno M.D.     Location:  OR  Urgency:  Elective  Indications for Airway Management:  Anesthesia      Spontaneous Ventilation: absent    Sedation Level:  Deep  Preoxygenated: Yes    Final Airway Type:  Supraglottic airway  Final Supraglottic Airway:  Standard LMA    SGA Size:  4  Number of Attempts at Approach:  1           0

## 2021-01-20 ENCOUNTER — TRANSCRIPTION ENCOUNTER (OUTPATIENT)
Age: 26
End: 2021-01-20

## 2021-02-28 ENCOUNTER — RESULT REVIEW (OUTPATIENT)
Age: 26
End: 2021-02-28

## 2021-03-01 ENCOUNTER — APPOINTMENT (OUTPATIENT)
Dept: OBGYN | Facility: CLINIC | Age: 26
End: 2021-03-01
Payer: SELF-PAY

## 2021-03-01 PROCEDURE — 99202 OFFICE O/P NEW SF 15 MIN: CPT

## 2021-03-01 PROCEDURE — 99072 ADDL SUPL MATRL&STAF TM PHE: CPT

## 2021-03-03 ENCOUNTER — RESULT REVIEW (OUTPATIENT)
Age: 26
End: 2021-03-03

## 2021-03-03 ENCOUNTER — OUTPATIENT (OUTPATIENT)
Dept: OUTPATIENT SERVICES | Facility: HOSPITAL | Age: 26
LOS: 1 days | End: 2021-03-03
Payer: COMMERCIAL

## 2021-03-03 ENCOUNTER — APPOINTMENT (OUTPATIENT)
Dept: ULTRASOUND IMAGING | Facility: IMAGING CENTER | Age: 26
End: 2021-03-03
Payer: SELF-PAY

## 2021-03-03 DIAGNOSIS — Z00.8 ENCOUNTER FOR OTHER GENERAL EXAMINATION: ICD-10-CM

## 2021-03-03 DIAGNOSIS — N83.20 UNSPECIFIED OVARIAN CYSTS: Chronic | ICD-10-CM

## 2021-03-03 PROCEDURE — 76641 ULTRASOUND BREAST COMPLETE: CPT | Mod: 26,50

## 2021-03-03 PROCEDURE — 76641 ULTRASOUND BREAST COMPLETE: CPT

## 2021-03-05 ENCOUNTER — TRANSCRIPTION ENCOUNTER (OUTPATIENT)
Age: 26
End: 2021-03-05

## 2021-12-20 ENCOUNTER — TRANSCRIPTION ENCOUNTER (OUTPATIENT)
Age: 26
End: 2021-12-20

## 2022-05-09 ENCOUNTER — NON-APPOINTMENT (OUTPATIENT)
Age: 27
End: 2022-05-09

## 2022-05-11 ENCOUNTER — NON-APPOINTMENT (OUTPATIENT)
Age: 27
End: 2022-05-11

## 2022-10-24 ENCOUNTER — NON-APPOINTMENT (OUTPATIENT)
Age: 27
End: 2022-10-24

## 2022-10-30 ENCOUNTER — NON-APPOINTMENT (OUTPATIENT)
Age: 27
End: 2022-10-30

## 2023-03-14 ENCOUNTER — APPOINTMENT (OUTPATIENT)
Dept: OBGYN | Facility: CLINIC | Age: 28
End: 2023-03-14
Payer: COMMERCIAL

## 2023-03-14 ENCOUNTER — INPATIENT (INPATIENT)
Facility: HOSPITAL | Age: 28
LOS: 2 days | Discharge: HOME | DRG: 832 | End: 2023-03-17
Attending: OBSTETRICS & GYNECOLOGY | Admitting: OBSTETRICS & GYNECOLOGY
Payer: COMMERCIAL

## 2023-03-14 VITALS
WEIGHT: 160.06 LBS | TEMPERATURE: 99 F | HEART RATE: 71 BPM | HEIGHT: 65 IN | DIASTOLIC BLOOD PRESSURE: 81 MMHG | SYSTOLIC BLOOD PRESSURE: 113 MMHG | RESPIRATION RATE: 20 BRPM | OXYGEN SATURATION: 98 %

## 2023-03-14 VITALS
HEIGHT: 64 IN | WEIGHT: 154 LBS | DIASTOLIC BLOOD PRESSURE: 82 MMHG | BODY MASS INDEX: 26.29 KG/M2 | SYSTOLIC BLOOD PRESSURE: 118 MMHG

## 2023-03-14 DIAGNOSIS — Z87.440 PERSONAL HISTORY OF URINARY (TRACT) INFECTIONS: ICD-10-CM

## 2023-03-14 DIAGNOSIS — N83.20 UNSPECIFIED OVARIAN CYSTS: Chronic | ICD-10-CM

## 2023-03-14 DIAGNOSIS — O21.0 MILD HYPEREMESIS GRAVIDARUM: ICD-10-CM

## 2023-03-14 DIAGNOSIS — R10.9 UNSPECIFIED ABDOMINAL PAIN: ICD-10-CM

## 2023-03-14 DIAGNOSIS — N91.2 AMENORRHEA, UNSPECIFIED: ICD-10-CM

## 2023-03-14 LAB
ALBUMIN SERPL ELPH-MCNC: 4.9 G/DL — SIGNIFICANT CHANGE UP (ref 3.3–5)
ALP SERPL-CCNC: 66 U/L — SIGNIFICANT CHANGE UP (ref 40–120)
ALT FLD-CCNC: 22 U/L — SIGNIFICANT CHANGE UP (ref 10–45)
ANION GAP SERPL CALC-SCNC: 18 MMOL/L — HIGH (ref 5–17)
APPEARANCE UR: CLEAR — SIGNIFICANT CHANGE UP
AST SERPL-CCNC: 22 U/L — SIGNIFICANT CHANGE UP (ref 10–40)
BACTERIA # UR AUTO: ABNORMAL
BASE EXCESS BLDV CALC-SCNC: -2 MMOL/L — SIGNIFICANT CHANGE UP (ref -2–3)
BASOPHILS # BLD AUTO: 0.04 K/UL — SIGNIFICANT CHANGE UP (ref 0–0.2)
BASOPHILS NFR BLD AUTO: 0.2 % — SIGNIFICANT CHANGE UP (ref 0–2)
BILIRUB SERPL-MCNC: 1 MG/DL — SIGNIFICANT CHANGE UP (ref 0.2–1.2)
BILIRUB UR-MCNC: NEGATIVE — SIGNIFICANT CHANGE UP
BUN SERPL-MCNC: 22 MG/DL — SIGNIFICANT CHANGE UP (ref 7–23)
CA-I SERPL-SCNC: 1.17 MMOL/L — SIGNIFICANT CHANGE UP (ref 1.15–1.33)
CALCIUM SERPL-MCNC: 10.6 MG/DL — HIGH (ref 8.4–10.5)
CHLORIDE BLDV-SCNC: 98 MMOL/L — SIGNIFICANT CHANGE UP (ref 96–108)
CHLORIDE SERPL-SCNC: 98 MMOL/L — SIGNIFICANT CHANGE UP (ref 96–108)
CO2 BLDV-SCNC: 22 MMOL/L — SIGNIFICANT CHANGE UP (ref 22–26)
CO2 SERPL-SCNC: 19 MMOL/L — LOW (ref 22–31)
COLOR SPEC: YELLOW — SIGNIFICANT CHANGE UP
CREAT SERPL-MCNC: 0.62 MG/DL — SIGNIFICANT CHANGE UP (ref 0.5–1.3)
DIFF PNL FLD: NEGATIVE — SIGNIFICANT CHANGE UP
EGFR: 124 ML/MIN/1.73M2 — SIGNIFICANT CHANGE UP
EOSINOPHIL # BLD AUTO: 0 K/UL — SIGNIFICANT CHANGE UP (ref 0–0.5)
EOSINOPHIL NFR BLD AUTO: 0 % — SIGNIFICANT CHANGE UP (ref 0–6)
EPI CELLS # UR: 3 /HPF — SIGNIFICANT CHANGE UP
FLUAV AG NPH QL: SIGNIFICANT CHANGE UP
FLUBV AG NPH QL: SIGNIFICANT CHANGE UP
GAS PNL BLDV: 132 MMOL/L — LOW (ref 136–145)
GAS PNL BLDV: SIGNIFICANT CHANGE UP
GLUCOSE BLDV-MCNC: 80 MG/DL — SIGNIFICANT CHANGE UP (ref 70–99)
GLUCOSE SERPL-MCNC: 97 MG/DL — SIGNIFICANT CHANGE UP (ref 70–99)
GLUCOSE UR QL: NEGATIVE — SIGNIFICANT CHANGE UP
HCG SERPL-ACNC: HIGH MIU/ML
HCO3 BLDV-SCNC: 21 MMOL/L — LOW (ref 22–29)
HCT VFR BLD CALC: 43.5 % — SIGNIFICANT CHANGE UP (ref 34.5–45)
HCT VFR BLDA CALC: 42 % — SIGNIFICANT CHANGE UP (ref 34.5–46.5)
HGB BLD CALC-MCNC: 14 G/DL — SIGNIFICANT CHANGE UP (ref 11.7–16.1)
HGB BLD-MCNC: 14.5 G/DL — SIGNIFICANT CHANGE UP (ref 11.5–15.5)
HYALINE CASTS # UR AUTO: 1 /LPF — SIGNIFICANT CHANGE UP (ref 0–2)
IMM GRANULOCYTES NFR BLD AUTO: 0.8 % — SIGNIFICANT CHANGE UP (ref 0–0.9)
KETONES UR-MCNC: ABNORMAL
LACTATE BLDV-MCNC: 2.7 MMOL/L — HIGH (ref 0.5–2)
LEUKOCYTE ESTERASE UR-ACNC: ABNORMAL
LYMPHOCYTES # BLD AUTO: 1.72 K/UL — SIGNIFICANT CHANGE UP (ref 1–3.3)
LYMPHOCYTES # BLD AUTO: 10 % — LOW (ref 13–44)
MAGNESIUM SERPL-MCNC: 2 MG/DL — SIGNIFICANT CHANGE UP (ref 1.6–2.6)
MCHC RBC-ENTMCNC: 27.9 PG — SIGNIFICANT CHANGE UP (ref 27–34)
MCHC RBC-ENTMCNC: 33.3 GM/DL — SIGNIFICANT CHANGE UP (ref 32–36)
MCV RBC AUTO: 83.7 FL — SIGNIFICANT CHANGE UP (ref 80–100)
MONOCYTES # BLD AUTO: 1.08 K/UL — HIGH (ref 0–0.9)
MONOCYTES NFR BLD AUTO: 6.3 % — SIGNIFICANT CHANGE UP (ref 2–14)
NEUTROPHILS # BLD AUTO: 14.18 K/UL — HIGH (ref 1.8–7.4)
NEUTROPHILS NFR BLD AUTO: 82.7 % — HIGH (ref 43–77)
NITRITE UR-MCNC: NEGATIVE — SIGNIFICANT CHANGE UP
NRBC # BLD: 0 /100 WBCS — SIGNIFICANT CHANGE UP (ref 0–0)
PCO2 BLDV: 29 MMHG — LOW (ref 39–42)
PH BLDV: 7.46 — HIGH (ref 7.32–7.43)
PH UR: 6.5 — SIGNIFICANT CHANGE UP (ref 5–8)
PHOSPHATE SERPL-MCNC: 3.2 MG/DL — SIGNIFICANT CHANGE UP (ref 2.5–4.5)
PLATELET # BLD AUTO: 320 K/UL — SIGNIFICANT CHANGE UP (ref 150–400)
PO2 BLDV: 35 MMHG — SIGNIFICANT CHANGE UP (ref 25–45)
POTASSIUM BLDV-SCNC: 3.7 MMOL/L — SIGNIFICANT CHANGE UP (ref 3.5–5.1)
POTASSIUM SERPL-MCNC: 3.4 MMOL/L — LOW (ref 3.5–5.3)
POTASSIUM SERPL-SCNC: 3.4 MMOL/L — LOW (ref 3.5–5.3)
PROT SERPL-MCNC: 8.8 G/DL — HIGH (ref 6–8.3)
PROT UR-MCNC: ABNORMAL
RBC # BLD: 5.2 M/UL — SIGNIFICANT CHANGE UP (ref 3.8–5.2)
RBC # FLD: 14.7 % — HIGH (ref 10.3–14.5)
RBC CASTS # UR COMP ASSIST: 1 /HPF — SIGNIFICANT CHANGE UP (ref 0–4)
RSV RNA NPH QL NAA+NON-PROBE: SIGNIFICANT CHANGE UP
SAO2 % BLDV: 56.2 % — LOW (ref 67–88)
SARS-COV-2 RNA SPEC QL NAA+PROBE: SIGNIFICANT CHANGE UP
SODIUM SERPL-SCNC: 135 MMOL/L — SIGNIFICANT CHANGE UP (ref 135–145)
SP GR SPEC: 1.03 — HIGH (ref 1.01–1.02)
UROBILINOGEN FLD QL: ABNORMAL
WBC # BLD: 17.15 K/UL — HIGH (ref 3.8–10.5)
WBC # FLD AUTO: 17.15 K/UL — HIGH (ref 3.8–10.5)
WBC UR QL: 4 /HPF — SIGNIFICANT CHANGE UP (ref 0–5)

## 2023-03-14 PROCEDURE — 99285 EMERGENCY DEPT VISIT HI MDM: CPT

## 2023-03-14 PROCEDURE — 76857 US EXAM PELVIC LIMITED: CPT

## 2023-03-14 PROCEDURE — 99213 OFFICE O/P EST LOW 20 MIN: CPT | Mod: 25

## 2023-03-14 PROCEDURE — 99222 1ST HOSP IP/OBS MODERATE 55: CPT | Mod: GC

## 2023-03-14 RX ORDER — HEPARIN SODIUM 5000 [USP'U]/ML
5000 INJECTION INTRAVENOUS; SUBCUTANEOUS EVERY 12 HOURS
Refills: 0 | Status: DISCONTINUED | OUTPATIENT
Start: 2023-03-14 | End: 2023-03-17

## 2023-03-14 RX ORDER — ONDANSETRON 8 MG/1
4 TABLET, FILM COATED ORAL EVERY 6 HOURS
Refills: 0 | Status: DISCONTINUED | OUTPATIENT
Start: 2023-03-14 | End: 2023-03-16

## 2023-03-14 RX ORDER — ONDANSETRON 8 MG/1
4 TABLET, FILM COATED ORAL ONCE
Refills: 0 | Status: COMPLETED | OUTPATIENT
Start: 2023-03-14 | End: 2023-03-14

## 2023-03-14 RX ORDER — SODIUM CHLORIDE 9 MG/ML
1000 INJECTION, SOLUTION INTRAVENOUS ONCE
Refills: 0 | Status: COMPLETED | OUTPATIENT
Start: 2023-03-14 | End: 2023-03-14

## 2023-03-14 RX ORDER — METOCLOPRAMIDE HCL 10 MG
10 TABLET ORAL EVERY 6 HOURS
Refills: 0 | Status: DISCONTINUED | OUTPATIENT
Start: 2023-03-14 | End: 2023-03-16

## 2023-03-14 RX ORDER — SODIUM CHLORIDE 9 MG/ML
1000 INJECTION, SOLUTION INTRAVENOUS
Refills: 0 | Status: DISCONTINUED | OUTPATIENT
Start: 2023-03-14 | End: 2023-03-14

## 2023-03-14 RX ORDER — FOLIC ACID 0.8 MG
1 TABLET ORAL ONCE
Refills: 0 | Status: COMPLETED | OUTPATIENT
Start: 2023-03-14 | End: 2023-03-14

## 2023-03-14 RX ORDER — FAMOTIDINE 10 MG/ML
20 INJECTION INTRAVENOUS
Refills: 0 | Status: DISCONTINUED | OUTPATIENT
Start: 2023-03-14 | End: 2023-03-16

## 2023-03-14 RX ORDER — SODIUM CHLORIDE 9 MG/ML
1000 INJECTION, SOLUTION INTRAVENOUS ONCE
Refills: 0 | Status: DISCONTINUED | OUTPATIENT
Start: 2023-03-14 | End: 2023-03-15

## 2023-03-14 RX ORDER — PYRIDOXINE HCL (VITAMIN B6) 100 MG
50 TABLET ORAL
Refills: 0 | Status: DISCONTINUED | OUTPATIENT
Start: 2023-03-14 | End: 2023-03-16

## 2023-03-14 RX ORDER — SODIUM CHLORIDE 9 MG/ML
1000 INJECTION, SOLUTION INTRAVENOUS
Refills: 0 | Status: DISCONTINUED | OUTPATIENT
Start: 2023-03-14 | End: 2023-03-17

## 2023-03-14 RX ORDER — FAMOTIDINE 10 MG/ML
20 INJECTION INTRAVENOUS
Refills: 0 | Status: DISCONTINUED | OUTPATIENT
Start: 2023-03-14 | End: 2023-03-14

## 2023-03-14 RX ORDER — THIAMINE MONONITRATE (VIT B1) 100 MG
500 TABLET ORAL ONCE
Refills: 0 | Status: COMPLETED | OUTPATIENT
Start: 2023-03-14 | End: 2023-03-14

## 2023-03-14 RX ORDER — DIPHENHYDRAMINE HCL 50 MG
25 CAPSULE ORAL EVERY 6 HOURS
Refills: 0 | Status: DISCONTINUED | OUTPATIENT
Start: 2023-03-14 | End: 2023-03-16

## 2023-03-14 RX ORDER — ACETAMINOPHEN 500 MG
1000 TABLET ORAL ONCE
Refills: 0 | Status: DISCONTINUED | OUTPATIENT
Start: 2023-03-14 | End: 2023-03-17

## 2023-03-14 RX ORDER — PYRIDOXINE HCL (VITAMIN B6) 100 MG
50 TABLET ORAL ONCE
Refills: 0 | Status: COMPLETED | OUTPATIENT
Start: 2023-03-14 | End: 2023-03-14

## 2023-03-14 RX ADMIN — HEPARIN SODIUM 5000 UNIT(S): 5000 INJECTION INTRAVENOUS; SUBCUTANEOUS at 17:45

## 2023-03-14 RX ADMIN — Medication 10 MILLIGRAM(S): at 13:43

## 2023-03-14 RX ADMIN — Medication 25 MILLIGRAM(S): at 23:47

## 2023-03-14 RX ADMIN — Medication 1 MILLIGRAM(S): at 17:17

## 2023-03-14 RX ADMIN — SODIUM CHLORIDE 1000 MILLILITER(S): 9 INJECTION, SOLUTION INTRAVENOUS at 12:30

## 2023-03-14 RX ADMIN — ONDANSETRON 4 MILLIGRAM(S): 8 TABLET, FILM COATED ORAL at 11:59

## 2023-03-14 RX ADMIN — Medication 25 MILLIGRAM(S): at 17:45

## 2023-03-14 RX ADMIN — FAMOTIDINE 20 MILLIGRAM(S): 10 INJECTION INTRAVENOUS at 17:18

## 2023-03-14 RX ADMIN — Medication 50 MILLIGRAM(S): at 21:24

## 2023-03-14 RX ADMIN — ONDANSETRON 4 MILLIGRAM(S): 8 TABLET, FILM COATED ORAL at 21:24

## 2023-03-14 RX ADMIN — Medication 105 MILLIGRAM(S): at 17:25

## 2023-03-14 RX ADMIN — SODIUM CHLORIDE 1000 MILLILITER(S): 9 INJECTION, SOLUTION INTRAVENOUS at 12:00

## 2023-03-14 RX ADMIN — SODIUM CHLORIDE 125 MILLILITER(S): 9 INJECTION, SOLUTION INTRAVENOUS at 17:18

## 2023-03-14 RX ADMIN — Medication 50 MILLIGRAM(S): at 12:00

## 2023-03-14 RX ADMIN — ONDANSETRON 4 MILLIGRAM(S): 8 TABLET, FILM COATED ORAL at 15:22

## 2023-03-14 NOTE — PLAN
[FreeTextEntry1] : Severe hyperemesis \par This complicated her last pregnancy\par Patient sent to ER for hydration/meds and possible admission

## 2023-03-14 NOTE — H&P ADULT - HISTORY OF PRESENT ILLNESS
EFRAIN HAWTHORNE  27y  Female [id number]    HPI:  26 yo  @ 6w5d (by LMP confirmed by first trimester US, TRISTIAN: 23) presenting with nausea and vomiting for 9 days. Patient has not been able to take in anything PO. She states she has vomited every 20 min for the last several days. Tried applesauce, jello, sips of water/juice, unable to tolerate anything. States last urination was yesterday AM. Denies recent bowel movements. Unsure if she has lost any weight. When she vomits it is mostly saliva and greenish bile. Patient's previous pregnancy was complicated by hyperemesis gravidarum she was discharged with: promethazine 25mg q6h PO, Zofran 4mg PO q4h, Benadryl 25mg q12h, Famotidine 20 mg PO BID, Pyridoxine 100 mg TID.     Denies fevers, chills. Denies vaginal bleeding, abnormal vaginal discharge. Denies abdominal pain.       Name of GYN Physician: Dr. Cyndy Ventura     Past OB:   x1 (2017) complicated by hyperemesis gravidarum     Past gyn: Denies fibroids, cysts, endometriosis, STI's, Abnormal pap smears     Home meds: none     Hospital Meds:   MEDICATIONS  (STANDING):  acetaminophen   IVPB .. 1000 milliGRAM(s) IV Intermittent once  dextrose 5% + sodium chloride 0.9% 1000 milliLiter(s) (125 mL/Hr) IV Continuous <Continuous>  diphenhydrAMINE Injectable 25 milliGRAM(s) IV Push every 6 hours  famotidine Injectable 20 milliGRAM(s) IV Push two times a day  folic acid Injectable 1 milliGRAM(s) IV Push once  heparin   Injectable 5000 Unit(s) SubCutaneous every 12 hours  lactated ringers Bolus 1000 milliLiter(s) IV Bolus once  ondansetron Injectable 4 milliGRAM(s) IV Push every 6 hours  pyridoxine Injectable 50 milliGRAM(s) IV Push <User Schedule>  thiamine IVPB 500 milliGRAM(s) IV Intermittent once    MEDICATIONS  (PRN):  metoclopramide Injectable 10 milliGRAM(s) IV Push every 6 hours PRN Nausea and/or Vomiting related to pregnancy      Allergies: Duricef (Hives)      PAST MEDICAL & SURGICAL HISTORY:  - GERD       Social History:  Denies smoking use, drug use, alcohol use.       Vital Signs Last 24 Hrs  T(C): 37.1 (14 Mar 2023 10:25), Max: 37.1 (14 Mar 2023 10:25)  T(F): 98.8 (14 Mar 2023 10:25), Max: 98.8 (14 Mar 2023 10:25)  HR: 75 (14 Mar 2023 15:01) (71 - 75)  BP: 100/56 (14 Mar 2023 15:) (100/56 - 113/81)  BP(mean): --  RR: 18 (14 Mar 2023 15:) (18 - 20)  SpO2: 98% (14 Mar 2023 15:) (98% - 98%)    Parameters below as of 14 Mar 2023 15:  Patient On (Oxygen Delivery Method): room air        Physical Exam:   General: sitting comfortably in bed, NAD   Abd: Soft, non-tender, non-distended.   :  No bleeding on pad.     Ext: non-tender b/l, no edema     LABS:                              14.5   17.15 )-----------( 320      ( 14 Mar 2023 11:25 )             43.5     03-14    135  |  98  |  22  ----------------------------<  97  3.4<L>   |  19<L>  |  0.62    Ca    10.6<H>      14 Mar 2023 11:25  Phos  3.2     03-14  Mg     2.0     03-14    TPro  8.8<H>  /  Alb  4.9  /  TBili  1.0  /  DBili  x   /  AST  22  /  ALT  22  /  AlkPhos  66  03-14    I&O's Detail

## 2023-03-14 NOTE — ED PROVIDER NOTE - PROGRESS NOTE DETAILS
Left message with OB office in regards to in office US result to confirm IUP  Nisa England PA-C Spoke to OB, Dr. Ventura who advised pt w/ confirmed IUP. Ob service inpt also saw pt and advised can admit to their service  Nisa England PA-C

## 2023-03-14 NOTE — ED PROVIDER NOTE - PHYSICAL EXAMINATION
CONSTITUTIONAL: Patient is awake, alert and oriented x 3. Patient is well appearing and in no acute distress  HEAD: NCAT  NECK: supple, FROM  LUNGS: CTA b/l, no wheezing or rales   HEART: RRR.+S1S2 no murmurs  ABDOMEN: Soft, non-distended, nttp, no rebound or guarding  EXTREMITY: no edema or calf tenderness b/l, FROM upper and lower ext b/l  SKIN: with no rash or lesions  NEURO: No focal deficits

## 2023-03-14 NOTE — ED PROVIDER NOTE - NSCAREINITIATED _GEN_ER
Patient notified of test results and Dr Portillo's recommendations. Patient verbalized understanding and agreeable.     Nisa England(PA)

## 2023-03-14 NOTE — ED PROVIDER NOTE - CLINICAL SUMMARY MEDICAL DECISION MAKING FREE TEXT BOX
cami -27 f  6 weeks by 1st trim us and dates with live iup confirmed at office today - with > 1 week n/v non billous unable to tolerate po - had hyperemesis in 1st pregnancy as well -no fever- no diarrhea no vaginal bleeding- no dysuria- pt abd soft nt - no tachycardia or hypotension , send screning labs - consider antiemetics - zofran/diclygis- need iv hydration - consider obs  as pt will need fluids and diet advancement

## 2023-03-14 NOTE — H&P ADULT - ASSESSMENT
26 yo  @ 6w5d (by LMP confirmed by first trimester US, TRISTIAN: 23) presenting with nausea and vomiting for 9 days. Confirmed IUP on sonogram today in office. History of hyperemesis gravidarum requiring hospitalization in previous pregnancy. Labs showing evidence of starvation ketosis and hemoconcentration.     - Admit to antepartum Dr. Trudy Perkins   - Standing Reglan, Benadryl, Zofran, Pepcid, Pyridoxine   - D5NS maitenance fluids  - Additional bolus ordered     Yahaira Godfrey PGY2  Patient seen and examined with Dr. Perkins

## 2023-03-14 NOTE — ED PROVIDER NOTE - OBJECTIVE STATEMENT
27 year old female  currently 6 weeks pregnant presents to ED c/o n/v x 9 days. Pt reports that she initially found out that she was pregnant 9 days ago and has persisted nausea and vomiting with limited PO intake since. Reports that she had severe n/v throughout her first pregnancy as well. Has not taken anything for symptoms thus far. Today had OB appointment and reports she had and US w. confirmed FHR and was advised to come to ED for hydration. She denies fevers, chills, chest pain, sob, abd pain, diarrhea, vaginal bleeding

## 2023-03-14 NOTE — PROCEDURE
[Transvaginal OB Sonogram] : Transvaginal OB Sonogram [Intrauterine Pregnancy] : intrauterine pregnancy [Date: ___] : Date: [unfilled]

## 2023-03-14 NOTE — ED CDU PROVIDER INITIAL DAY NOTE - CLINICAL SUMMARY MEDICAL DECISION MAKING FREE TEXT BOX
cami -27 f  with severe hyperemesis gravidarum , dehydration - needs iv hydration, antemetics, and final ob recs-

## 2023-03-14 NOTE — ED PROVIDER NOTE - RAPID ASSESSMENT
Attending/MD Kimberly. 28 yo F, with , 6wk pregnancy, p/w nauseous, vomits, x 9d, reportedly IUP with fetal HR, sent by OB/GYN for hyperemesis, pt denies sob, cramping pain, or vaginal bleed.    Patient was rapidly assessed via qdoc encounter; a limited history and physical exam was performed. The patient will be seen and further worked up in the main emergency department and their care will be completed by the main emergency department team. Receiving team will follow up on labs, analgesia, any clinical imaging, and perform reassessment and disposition of the patient as clinically indicated. All decisions regarding the progression of care will be made at their discretion.  -kimberly

## 2023-03-14 NOTE — ED ADULT NURSE NOTE - COMFORT/ACCEPTABLE PAIN LEVEL (0-10)
refill request/documentation request/pt update  Received: Yesterday       Elena Morales, Deja MARAVILLA, RN       Phone Number: [phone]                     Caller:     Relationship to pt: mother   Medication:   cefdinir   How many days left of med do you have left (if >3 day supply, redirect to pharmacy): one dose   Pharmacy and location: [facility] in [place]   Pending appt date:  3/19   Okay to leave detailed VM:  Yes     Mom needs documentation of PANDAS dx for pt's IEP at school.     Things aren't going as well as they were after the prednisone. Pt is having to get picked up early from school due to behaviors and anxiety.          0

## 2023-03-14 NOTE — ED PROVIDER NOTE - ATTENDING APP SHARED VISIT CONTRIBUTION OF CARE
This was a shared visit with SHARAN.  I have reviewed and discussed the case with the SHARAN and agree with verified documentation unless otherwise documented.  I have independently spoken with and examined the patient and my documentation of history/pe and MDM are above.

## 2023-03-14 NOTE — H&P ADULT - ATTENDING COMMENTS
OB attg note    Patient seen and examined, discussed with patient. Patient is a 28yo  at 6+5 dated by TVUS in office today with viable pregnancy, sent from office for severe n/v over last 1-2 weeks with up to >20 episodes of emesis daily. Has not been able to keep any food down and emesis has been bile and on occasion brown emesis. No OB complaints. Prior pregnancy c/b hyperemsis requiring inpatient admission. Has not voided since yesterday morning. Labs with elev WBC but no e/o systemic infection, K 3.4, +beta hydroxybutyrate suggestive of starvation ketosis. Plan to admit for IV antiemetics and fluid repletion. Desired pregnancy. Transition to PO antiemetics when tolerating PO.    Glenis PRICE

## 2023-03-14 NOTE — ED ADULT NURSE NOTE - OBJECTIVE STATEMENT
Pt  6 weeks pregnant (LMP "sometime in January) reports 9 days of nausea, vomiting, weakness.  Has not been able to tolerate PO at home, estimates she is vomiting "every hour."  Saw OB today with confirmed IUP pregnancy and sent in for hyperemesis (also had during 1st pregnancy).  Pt conversive, reports feeling weak, abdomen soft/non-distended/non-tender, skin warm, dry, intact denies chest pain, shortness of breath, cough, abdominal pain, diarrhea, fevers, chills, urinary symptoms, syncope.

## 2023-03-14 NOTE — HISTORY OF PRESENT ILLNESS
[Patient reported PAP Smear was normal] : Patient reported PAP Smear was normal [FreeTextEntry1] : Patient with LMP in January \par She is unsure of the date \par She has severe nausea and vomiting \par She had this her last pregnancy and was hospitalized twice  [PapSmeardate] : 2021

## 2023-03-14 NOTE — PATIENT PROFILE ADULT - FALL HARM RISK - HARM RISK INTERVENTIONS

## 2023-03-15 LAB
ANION GAP SERPL CALC-SCNC: 12 MMOL/L — SIGNIFICANT CHANGE UP (ref 5–17)
BASOPHILS # BLD AUTO: 0.05 K/UL — SIGNIFICANT CHANGE UP (ref 0–0.2)
BASOPHILS NFR BLD AUTO: 0.4 % — SIGNIFICANT CHANGE UP (ref 0–2)
BUN SERPL-MCNC: 15 MG/DL — SIGNIFICANT CHANGE UP (ref 7–23)
CALCIUM SERPL-MCNC: 8.6 MG/DL — SIGNIFICANT CHANGE UP (ref 8.4–10.5)
CHLORIDE SERPL-SCNC: 103 MMOL/L — SIGNIFICANT CHANGE UP (ref 96–108)
CO2 SERPL-SCNC: 21 MMOL/L — LOW (ref 22–31)
CREAT SERPL-MCNC: 0.56 MG/DL — SIGNIFICANT CHANGE UP (ref 0.5–1.3)
EGFR: 127 ML/MIN/1.73M2 — SIGNIFICANT CHANGE UP
EOSINOPHIL # BLD AUTO: 0.13 K/UL — SIGNIFICANT CHANGE UP (ref 0–0.5)
EOSINOPHIL NFR BLD AUTO: 1.1 % — SIGNIFICANT CHANGE UP (ref 0–6)
GLUCOSE SERPL-MCNC: 75 MG/DL — SIGNIFICANT CHANGE UP (ref 70–99)
HCT VFR BLD CALC: 33.9 % — LOW (ref 34.5–45)
HGB BLD-MCNC: 11 G/DL — LOW (ref 11.5–15.5)
IMM GRANULOCYTES NFR BLD AUTO: 0.6 % — SIGNIFICANT CHANGE UP (ref 0–0.9)
LYMPHOCYTES # BLD AUTO: 2.92 K/UL — SIGNIFICANT CHANGE UP (ref 1–3.3)
LYMPHOCYTES # BLD AUTO: 24.8 % — SIGNIFICANT CHANGE UP (ref 13–44)
MCHC RBC-ENTMCNC: 28 PG — SIGNIFICANT CHANGE UP (ref 27–34)
MCHC RBC-ENTMCNC: 32.4 GM/DL — SIGNIFICANT CHANGE UP (ref 32–36)
MCV RBC AUTO: 86.3 FL — SIGNIFICANT CHANGE UP (ref 80–100)
MONOCYTES # BLD AUTO: 1.1 K/UL — HIGH (ref 0–0.9)
MONOCYTES NFR BLD AUTO: 9.3 % — SIGNIFICANT CHANGE UP (ref 2–14)
NEUTROPHILS # BLD AUTO: 7.5 K/UL — HIGH (ref 1.8–7.4)
NEUTROPHILS NFR BLD AUTO: 63.8 % — SIGNIFICANT CHANGE UP (ref 43–77)
NRBC # BLD: 0 /100 WBCS — SIGNIFICANT CHANGE UP (ref 0–0)
PLATELET # BLD AUTO: 226 K/UL — SIGNIFICANT CHANGE UP (ref 150–400)
POTASSIUM SERPL-MCNC: 3 MMOL/L — LOW (ref 3.5–5.3)
POTASSIUM SERPL-MCNC: 3.6 MMOL/L — SIGNIFICANT CHANGE UP (ref 3.5–5.3)
POTASSIUM SERPL-SCNC: 3 MMOL/L — LOW (ref 3.5–5.3)
POTASSIUM SERPL-SCNC: 3.6 MMOL/L — SIGNIFICANT CHANGE UP (ref 3.5–5.3)
RBC # BLD: 3.93 M/UL — SIGNIFICANT CHANGE UP (ref 3.8–5.2)
RBC # FLD: 14.8 % — HIGH (ref 10.3–14.5)
SODIUM SERPL-SCNC: 136 MMOL/L — SIGNIFICANT CHANGE UP (ref 135–145)
WBC # BLD: 11.77 K/UL — HIGH (ref 3.8–10.5)
WBC # FLD AUTO: 11.77 K/UL — HIGH (ref 3.8–10.5)

## 2023-03-15 PROCEDURE — 99222 1ST HOSP IP/OBS MODERATE 55: CPT | Mod: GC

## 2023-03-15 RX ORDER — SODIUM CHLORIDE 9 MG/ML
1000 INJECTION, SOLUTION INTRAVENOUS
Refills: 0 | Status: DISCONTINUED | OUTPATIENT
Start: 2023-03-15 | End: 2023-03-17

## 2023-03-15 RX ORDER — POTASSIUM CHLORIDE 20 MEQ
10 PACKET (EA) ORAL
Refills: 0 | Status: COMPLETED | OUTPATIENT
Start: 2023-03-15 | End: 2023-03-15

## 2023-03-15 RX ADMIN — ONDANSETRON 4 MILLIGRAM(S): 8 TABLET, FILM COATED ORAL at 20:39

## 2023-03-15 RX ADMIN — Medication 50 MILLIGRAM(S): at 16:16

## 2023-03-15 RX ADMIN — Medication 10 MILLIGRAM(S): at 15:02

## 2023-03-15 RX ADMIN — Medication 100 MILLIEQUIVALENT(S): at 11:19

## 2023-03-15 RX ADMIN — FAMOTIDINE 20 MILLIGRAM(S): 10 INJECTION INTRAVENOUS at 05:45

## 2023-03-15 RX ADMIN — Medication 25 MILLIGRAM(S): at 18:04

## 2023-03-15 RX ADMIN — Medication 100 MILLIEQUIVALENT(S): at 13:31

## 2023-03-15 RX ADMIN — Medication 100 MILLIEQUIVALENT(S): at 12:57

## 2023-03-15 RX ADMIN — SODIUM CHLORIDE 125 MILLILITER(S): 9 INJECTION, SOLUTION INTRAVENOUS at 06:26

## 2023-03-15 RX ADMIN — Medication 50 MILLIGRAM(S): at 05:45

## 2023-03-15 RX ADMIN — Medication 100 MILLIEQUIVALENT(S): at 11:25

## 2023-03-15 RX ADMIN — HEPARIN SODIUM 5000 UNIT(S): 5000 INJECTION INTRAVENOUS; SUBCUTANEOUS at 05:45

## 2023-03-15 RX ADMIN — HEPARIN SODIUM 5000 UNIT(S): 5000 INJECTION INTRAVENOUS; SUBCUTANEOUS at 17:11

## 2023-03-15 RX ADMIN — ONDANSETRON 4 MILLIGRAM(S): 8 TABLET, FILM COATED ORAL at 09:16

## 2023-03-15 RX ADMIN — FAMOTIDINE 20 MILLIGRAM(S): 10 INJECTION INTRAVENOUS at 17:12

## 2023-03-15 RX ADMIN — ONDANSETRON 4 MILLIGRAM(S): 8 TABLET, FILM COATED ORAL at 02:28

## 2023-03-15 RX ADMIN — Medication 100 MILLIEQUIVALENT(S): at 16:17

## 2023-03-15 RX ADMIN — Medication 25 MILLIGRAM(S): at 12:52

## 2023-03-15 RX ADMIN — Medication 100 MILLIEQUIVALENT(S): at 10:30

## 2023-03-15 RX ADMIN — Medication 25 MILLIGRAM(S): at 05:45

## 2023-03-15 RX ADMIN — ONDANSETRON 4 MILLIGRAM(S): 8 TABLET, FILM COATED ORAL at 14:56

## 2023-03-15 RX ADMIN — SODIUM CHLORIDE 125 MILLILITER(S): 9 INJECTION, SOLUTION INTRAVENOUS at 11:23

## 2023-03-15 NOTE — CONSULT NOTE ADULT - ATTENDING COMMENTS
Agree with above. Pt with hyperemesis gravidarum, in first trimester of pregnancy. She reported vomiting brownish material yesterday, but not nieves blood. No melena. Currently H/H is stable. Doubt active GI bleeding. Suspect just bilious vomiting, though some gastric prolapse gastritis or esophagitis is possible with persistent vomiting. No indications for endoscopic evaluation at this time. Recommend IV hydration, antiemetics to treat hyperemesis.

## 2023-03-15 NOTE — CONSULT NOTE ADULT - ASSESSMENT
Impression:     #Concern for coffee ground emesis  Hgb on admission 14.6, mild decrease to 11, no active GI bleed at this time. Anemia likely dilutional with IV fluids. No risk factors for UGIB at this time. Other differentials include possible Yuliet Bakari tear with significant retching and persistent vomiting which usually heals over time, recommend antiemetics to prevent vomiting. Other differentials include PUD, angioectasia, and possible malignancy, although less likely.     Recommendations:   - Hold off on procedure for now.   - Recommend PO PPI daily.   - Can continue with regular diet.   - Antiemetics as per primary team.   - Monitor CBC daily and transfuse if hgb < 7.   - If she has hematemesis, melena or another episode of CGE with declining hgb levels, please re-consult GI.     Thank you for the consult. Please call us back if you have any questions or concerns.     All recommendations are tentative until the note is attested by an attending.     Marley Randall, PGY-4  Gastroenterology/Hepatology Fellow  Available on [employer] Teams  [phone] (Short Range Pager)  [phone] (Long Range Pager)    After 5pm, please contact the on-call GI fellow. [phone]     Impression:     #Concern for coffee ground emesis  Hgb on admission 14.6, mild decrease to 11, no active GI bleed at this time. Anemia likely dilutional with IV fluids. No risk factors for UGIB at this time. Other differentials include possible Yuliet Bakari tear with significant retching and persistent vomiting which usually heals over time, recommend antiemetics to prevent vomiting. Other differentials include PUD, angioectasia, and possible malignancy, although less likely.     Recommendations:   - Hold off on procedure for now. No need for endoscopic evaluation at thist [facility].  - Continue Pepcid   - Can continue with regular diet.   - Antiemetics as per primary team.   - Monitor CBC daily and transfuse if hgb < 7.   - If she has hematemesis, melena or another episode of CGE with declining hgb levels, please re-consult GI.     Thank you for the consult. Please call us back if you have any questions or concerns.     All recommendations are tentative until the note is attested by an attending.     Marley Randall, PGY-4  Gastroenterology/Hepatology Fellow  Available on [employer] Teams  [phone] (Short Range Pager)  [phone] (Long Range Pager)    After 5pm, please contact the on-call GI fellow. [phone]

## 2023-03-15 NOTE — CONSULT NOTE ADULT - SUBJECTIVE AND OBJECTIVE BOX
HPI:    Ms. Beasley is a 28 yo  currently 6 weeks pregnant who presented with nausea and vomiting for the past 10 days. She denied abd pain, fevers and chills. Pt. reported that her initial vomiting was bile and green in color but she noticed brown material 2 days prior to admission, unknown episodes. Pt. was diagnosed with hyperemesis gravidarum during her first pregnancy. Pt. is unclear if she had CGE before. She never had EGD or colonoscopy before. Denied AC/ASA/NSAID  use. Currently, patient feels well with antiemetics, has no nausea or vomiting, tolerating regular diet well.     Allergies:  Duricef (Hives)      Home Medications:  None    Hospital Medications:  acetaminophen   IVPB .. 1000 milliGRAM(s) IV Intermittent once  dextrose 5% + sodium chloride 0.9% 1000 milliLiter(s) IV Continuous <Continuous>  dextrose 5% + sodium chloride 0.9%. 1000 milliLiter(s) IV Continuous <Continuous>  diphenhydrAMINE Injectable 25 milliGRAM(s) IV Push every 6 hours  famotidine Injectable 20 milliGRAM(s) IV Push two times a day  heparin   Injectable 5000 Unit(s) SubCutaneous every 12 hours  metoclopramide Injectable 10 milliGRAM(s) IV Push every 6 hours PRN  ondansetron Injectable 4 milliGRAM(s) IV Push every 6 hours  potassium chloride  10 mEq/100 mL IVPB 10 milliEquivalent(s) IV Intermittent every 1 hour  pyridoxine Injectable 50 milliGRAM(s) IV Push <User Schedule>      PMHX/PSHX:  No pertinent past medical history    Gastritis    UTI (urinary tract infection)    No significant past surgical history    Benign ovarian cyst    Family history:      Denies family history of colon cancer/polyps, stomach cancer/polyps, pancreatic cancer/masses, liver cancer/disease, ovarian cancer and endometrial cancer.    Social History:   Tob: Denies  EtOH: Denies  Illicit Drugs: Denies    ROS:     General:  No wt loss, fevers, chills, night sweats, fatigue  Eyes:  Good vision, no reported pain  ENT:  No sore throat, pain, runny nose, dysphagia  CV:  No pain, palpitations, hypo/hypertension  Pulm:  No dyspnea, cough, tachypnea, wheezing  GI:  see HPI  :  No pain, bleeding, incontinence, nocturia  Muscle:  No pain, weakness  Neuro:  No weakness, tingling, memory problems  Psych:  No fatigue, insomnia, mood problems, depression  Endocrine:  No polyuria, polydipsia, cold/heat intolerance  Heme:  No petechiae, ecchymosis, easy bruisability  Skin:  No rash, tattoos, scars, edema    PHYSICAL EXAM:     GENERAL:  No acute distress, lying in bed, appears comfortable.   HEENT:  NCAT, no scleral icterus   CHEST:  no respiratory distress  HEART:  Regular rate and rhythm  ABDOMEN:  Soft, non-tender, non-distended, no palpable masses  EXTREMITIES: No LE edema  SKIN:  No rash/erythema/ecchymoses/petechiae/wounds/abscess/warm/dry  NEURO:  Alert and oriented x 3, no tremors.     Vital Signs:  Vital Signs Last 24 Hrs  T(C): 36.7 (15 Mar 2023 09:00), Max: 37.2 (14 Mar 2023 16:00)  T(F): 98 (15 Mar 2023 09:00), Max: 99 (14 Mar 2023 16:00)  HR: 77 (15 Mar 2023 09:00) (61 - 77)  BP: 106/67 (15 Mar 2023 09:00) (99/62 - 117/74)  BP(mean): --  RR: 18 (15 Mar 2023 09:00) (16 - 18)  SpO2: 99% (15 Mar 2023 09:00) (98% - 100%)    Parameters below as of 15 Mar 2023 09:00  Patient On (Oxygen Delivery Method): room air      Daily     Daily     LABS:                        11.0   11.77 )-----------( 226      ( 15 Mar 2023 06:56 )             33.9     Mean Cell Volume: 86.3 fl (03-15-23 @ 06:56)    03-15    136  |  103  |  15  ----------------------------<  75  3.0<L>   |  21<L>  |  0.56    Ca    8.6      15 Mar 2023 06:55  Phos  3.2       Mg     2.0     14    TPro  8.8<H>  /  Alb  4.9  /  TBili  1.0  /  DBili  x   /  AST  22  /  ALT  22  /  AlkPhos  66  14    LIVER FUNCTIONS - ( 14 Mar 2023 11:25 )  Alb: 4.9 g/dL / Pro: 8.8 g/dL / ALK PHOS: 66 U/L / ALT: 22 U/L / AST: 22 U/L / GGT: x             Urinalysis Basic - ( 14 Mar 2023 18:03 )    Color: Yellow / Appearance: Clear / S.035 / pH: x  Gluc: x / Ketone: Large  / Bili: Negative / Urobili: 3 mg/dL   Blood: x / Protein: 30 mg/dL / Nitrite: Negative   Leuk Esterase: Large / RBC: 1 /hpf / WBC 4 /HPF   Sq Epi: x / Non Sq Epi: 3 /hpf / Bacteria: Many                              11.0   11.77 )-----------( 226      ( 15 Mar 2023 06:56 )             33.9                         14.5   17.15 )-----------( 320      ( 14 Mar 2023 11:25 )             43.5       Imaging:    None         HPI:    Ms. Beasley is a 26 yo  currently 6 weeks pregnant who presented with nausea and vomiting for the past 10 days. She denied abd pain, fevers and chills. Pt. reported that her initial vomiting was bile and green in color but she noticed brown material 2 days prior to admission, unknown episodes. Pt. was diagnosed with hyperemesis gravidarum during her first pregnancy. Pt. is unclear if she had CGE before. She never had EGD or colonoscopy before. Denied AC/ASA/NSAID  use. Currently, patient feels well with antiemetics, has no nausea or vomiting, tolerating regular diet well.     Allergies:  Duricef (Hives)      Home Medications:  None    Hospital Medications:  acetaminophen   IVPB .. 1000 milliGRAM(s) IV Intermittent once  dextrose 5% + sodium chloride 0.9% 1000 milliLiter(s) IV Continuous <Continuous>  dextrose 5% + sodium chloride 0.9%. 1000 milliLiter(s) IV Continuous <Continuous>  diphenhydrAMINE Injectable 25 milliGRAM(s) IV Push every 6 hours  famotidine Injectable 20 milliGRAM(s) IV Push two times a day  heparin   Injectable 5000 Unit(s) SubCutaneous every 12 hours  metoclopramide Injectable 10 milliGRAM(s) IV Push every 6 hours PRN  ondansetron Injectable 4 milliGRAM(s) IV Push every 6 hours  potassium chloride  10 mEq/100 mL IVPB 10 milliEquivalent(s) IV Intermittent every 1 hour  pyridoxine Injectable 50 milliGRAM(s) IV Push <User Schedule>      PMHX/PSHX:  No pertinent past medical history    Gastritis    UTI (urinary tract infection)    No significant past surgical history    Benign ovarian cyst    Family history:      Denies family history of colon cancer/polyps, stomach cancer/polyps, pancreatic cancer/masses, liver cancer/disease, ovarian cancer and endometrial cancer.    Social History:   Tob: Denies  EtOH: Denies  Illicit Drugs: Denies    ROS:     General:  No wt loss, fevers, chills, night sweats, fatigue  Eyes:  Good vision, no reported pain  ENT:  No sore throat, pain, runny nose, dysphagia  CV:  No pain, palpitations, hypo/hypertension  Pulm:  No dyspnea, cough, tachypnea, wheezing  GI:  see HPI  :  No pain, bleeding, incontinence, nocturia  Muscle:  No pain, weakness  Neuro:  No weakness, tingling, memory problems  Psych:  No fatigue, insomnia, mood problems, depression  Endocrine:  No polyuria, polydipsia, cold/heat intolerance  Heme:  No petechiae, ecchymosis, easy bruisability  Skin:  No rash, tattoos, scars, edema    PHYSICAL EXAM:     GENERAL:  No acute distress, lying in bed, appears comfortable.   HEENT:  NCAT, no scleral icterus   CHEST:  no respiratory distress  HEART:  Regular rate and rhythm  ABDOMEN:  Soft, non-tender, non-distended, no palpable masses  EXTREMITIES: No LE edema  SKIN:  No rash/erythema/ecchymoses/petechiae/wounds/abscess/warm/dry  NEURO:  Alert and oriented x 3, no tremors.   PSYCH: normal affect    Vital Signs:  Vital Signs Last 24 Hrs  T(C): 36.7 (15 Mar 2023 09:00), Max: 37.2 (14 Mar 2023 16:00)  T(F): 98 (15 Mar 2023 09:00), Max: 99 (14 Mar 2023 16:00)  HR: 77 (15 Mar 2023 09:00) (61 - 77)  BP: 106/67 (15 Mar 2023 09:00) (99/62 - 117/74)  BP(mean): --  RR: 18 (15 Mar 2023 09:00) (16 - 18)  SpO2: 99% (15 Mar 2023 09:00) (98% - 100%)    Parameters below as of 15 Mar 2023 09:00  Patient On (Oxygen Delivery Method): room air      Daily     Daily     LABS:                        11.0   11.77 )-----------( 226      ( 15 Mar 2023 06:56 )             33.9     Mean Cell Volume: 86.3 fl (03-15- @ 06:56)    03-15    136  |  103  |  15  ----------------------------<  75  3.0<L>   |  21<L>  |  0.56    Ca    8.6      15 Mar 2023 06:55  Phos  3.2       Mg     2.0         TPro  8.8<H>  /  Alb  4.9  /  TBili  1.0  /  DBili  x   /  AST  22  /  ALT  22  /  AlkPhos  66  03-14    LIVER FUNCTIONS - ( 14 Mar 2023 11:25 )  Alb: 4.9 g/dL / Pro: 8.8 g/dL / ALK PHOS: 66 U/L / ALT: 22 U/L / AST: 22 U/L / GGT: x             Urinalysis Basic - ( 14 Mar 2023 18:03 )    Color: Yellow / Appearance: Clear / S.035 / pH: x  Gluc: x / Ketone: Large  / Bili: Negative / Urobili: 3 mg/dL   Blood: x / Protein: 30 mg/dL / Nitrite: Negative   Leuk Esterase: Large / RBC: 1 /hpf / WBC 4 /HPF   Sq Epi: x / Non Sq Epi: 3 /hpf / Bacteria: Many                              11.0   11.77 )-----------( 226      ( 15 Mar 2023 06:56 )             33.9                         14.5   17.15 )-----------( 320      ( 14 Mar 2023 11:25 )             43.5       Imaging:    None

## 2023-03-16 LAB
ANION GAP SERPL CALC-SCNC: 9 MMOL/L — SIGNIFICANT CHANGE UP (ref 5–17)
BASOPHILS # BLD AUTO: 0.04 K/UL — SIGNIFICANT CHANGE UP (ref 0–0.2)
BASOPHILS NFR BLD AUTO: 0.4 % — SIGNIFICANT CHANGE UP (ref 0–2)
BUN SERPL-MCNC: 6 MG/DL — LOW (ref 7–23)
CALCIUM SERPL-MCNC: 8.5 MG/DL — SIGNIFICANT CHANGE UP (ref 8.4–10.5)
CHLORIDE SERPL-SCNC: 106 MMOL/L — SIGNIFICANT CHANGE UP (ref 96–108)
CO2 SERPL-SCNC: 22 MMOL/L — SIGNIFICANT CHANGE UP (ref 22–31)
CREAT SERPL-MCNC: 0.56 MG/DL — SIGNIFICANT CHANGE UP (ref 0.5–1.3)
EGFR: 127 ML/MIN/1.73M2 — SIGNIFICANT CHANGE UP
EOSINOPHIL # BLD AUTO: 0.21 K/UL — SIGNIFICANT CHANGE UP (ref 0–0.5)
EOSINOPHIL NFR BLD AUTO: 2 % — SIGNIFICANT CHANGE UP (ref 0–6)
GLUCOSE SERPL-MCNC: 84 MG/DL — SIGNIFICANT CHANGE UP (ref 70–99)
HCT VFR BLD CALC: 31.2 % — LOW (ref 34.5–45)
HGB BLD-MCNC: 10.2 G/DL — LOW (ref 11.5–15.5)
IMM GRANULOCYTES NFR BLD AUTO: 0.7 % — SIGNIFICANT CHANGE UP (ref 0–0.9)
LYMPHOCYTES # BLD AUTO: 2.61 K/UL — SIGNIFICANT CHANGE UP (ref 1–3.3)
LYMPHOCYTES # BLD AUTO: 24.3 % — SIGNIFICANT CHANGE UP (ref 13–44)
MCHC RBC-ENTMCNC: 28.4 PG — SIGNIFICANT CHANGE UP (ref 27–34)
MCHC RBC-ENTMCNC: 32.7 GM/DL — SIGNIFICANT CHANGE UP (ref 32–36)
MCV RBC AUTO: 86.9 FL — SIGNIFICANT CHANGE UP (ref 80–100)
MONOCYTES # BLD AUTO: 0.75 K/UL — SIGNIFICANT CHANGE UP (ref 0–0.9)
MONOCYTES NFR BLD AUTO: 7 % — SIGNIFICANT CHANGE UP (ref 2–14)
NEUTROPHILS # BLD AUTO: 7.08 K/UL — SIGNIFICANT CHANGE UP (ref 1.8–7.4)
NEUTROPHILS NFR BLD AUTO: 65.6 % — SIGNIFICANT CHANGE UP (ref 43–77)
NRBC # BLD: 0 /100 WBCS — SIGNIFICANT CHANGE UP (ref 0–0)
PLATELET # BLD AUTO: 208 K/UL — SIGNIFICANT CHANGE UP (ref 150–400)
POTASSIUM SERPL-MCNC: 3.4 MMOL/L — LOW (ref 3.5–5.3)
POTASSIUM SERPL-SCNC: 3.4 MMOL/L — LOW (ref 3.5–5.3)
RBC # BLD: 3.59 M/UL — LOW (ref 3.8–5.2)
RBC # FLD: 14.9 % — HIGH (ref 10.3–14.5)
SODIUM SERPL-SCNC: 137 MMOL/L — SIGNIFICANT CHANGE UP (ref 135–145)
WBC # BLD: 10.76 K/UL — HIGH (ref 3.8–10.5)
WBC # FLD AUTO: 10.76 K/UL — HIGH (ref 3.8–10.5)

## 2023-03-16 PROCEDURE — 99232 SBSQ HOSP IP/OBS MODERATE 35: CPT

## 2023-03-16 RX ORDER — FAMOTIDINE 10 MG/ML
20 INJECTION INTRAVENOUS DAILY
Refills: 0 | Status: DISCONTINUED | OUTPATIENT
Start: 2023-03-16 | End: 2023-03-17

## 2023-03-16 RX ORDER — DIPHENHYDRAMINE HCL 50 MG
25 CAPSULE ORAL EVERY 6 HOURS
Refills: 0 | Status: DISCONTINUED | OUTPATIENT
Start: 2023-03-16 | End: 2023-03-17

## 2023-03-16 RX ORDER — DIPHENHYDRAMINE HCL 50 MG
25 CAPSULE ORAL EVERY 4 HOURS
Refills: 0 | Status: DISCONTINUED | OUTPATIENT
Start: 2023-03-16 | End: 2023-03-16

## 2023-03-16 RX ORDER — SODIUM CHLORIDE 9 MG/ML
1000 INJECTION, SOLUTION INTRAVENOUS
Refills: 0 | Status: DISCONTINUED | OUTPATIENT
Start: 2023-03-16 | End: 2023-03-16

## 2023-03-16 RX ORDER — PYRIDOXINE HCL (VITAMIN B6) 100 MG
25 TABLET ORAL
Refills: 0 | Status: DISCONTINUED | OUTPATIENT
Start: 2023-03-16 | End: 2023-03-17

## 2023-03-16 RX ADMIN — FAMOTIDINE 20 MILLIGRAM(S): 10 INJECTION INTRAVENOUS at 06:11

## 2023-03-16 RX ADMIN — HEPARIN SODIUM 5000 UNIT(S): 5000 INJECTION INTRAVENOUS; SUBCUTANEOUS at 06:09

## 2023-03-16 RX ADMIN — Medication 25 MILLIGRAM(S): at 11:52

## 2023-03-16 RX ADMIN — FAMOTIDINE 20 MILLIGRAM(S): 10 INJECTION INTRAVENOUS at 18:48

## 2023-03-16 RX ADMIN — Medication 25 MILLIGRAM(S): at 16:18

## 2023-03-16 RX ADMIN — HEPARIN SODIUM 5000 UNIT(S): 5000 INJECTION INTRAVENOUS; SUBCUTANEOUS at 18:48

## 2023-03-16 RX ADMIN — Medication 25 MILLIGRAM(S): at 06:06

## 2023-03-16 RX ADMIN — ONDANSETRON 4 MILLIGRAM(S): 8 TABLET, FILM COATED ORAL at 03:25

## 2023-03-16 RX ADMIN — Medication 50 MILLIGRAM(S): at 06:15

## 2023-03-16 RX ADMIN — ONDANSETRON 4 MILLIGRAM(S): 8 TABLET, FILM COATED ORAL at 08:52

## 2023-03-16 RX ADMIN — Medication 25 MILLIGRAM(S): at 00:26

## 2023-03-16 RX ADMIN — Medication 25 MILLIGRAM(S): at 20:20

## 2023-03-16 RX ADMIN — Medication 50 MILLIGRAM(S): at 00:27

## 2023-03-16 RX ADMIN — Medication 25 MILLIGRAM(S): at 14:27

## 2023-03-16 NOTE — DIETITIAN INITIAL EVALUATION ADULT - PERSON TAUGHT/METHOD
- reviewed pregnancy nutrition therapy and tips for nausea / vomiting during pregnancy, consuming plain/bland foods, as able, when having nausea/ vomiting episodes, small frequents meals, menu provided/verbal instruction/written material/teach back - (Patient repeats in own words)/patient instructed

## 2023-03-16 NOTE — DIETITIAN INITIAL EVALUATION ADULT - ADD RECOMMEND
- Food preferences obtained and updated. Menu provided. RD to honor as able   - Will continue to monitor PO intake, weight, labs, skin, GI status, diet.   - Nutrition care plan to remain consistent with pt goals of care   - RD remains available to review diet education and adjust diet recommendations as needed.

## 2023-03-16 NOTE — DIETITIAN INITIAL EVALUATION ADULT - REASON INDICATOR FOR ASSESSMENT
Consult received for unintentional wt loss PTA  Information obtained from pt, electronic medical record

## 2023-03-16 NOTE — DIETITIAN INITIAL EVALUATION ADULT - PERTINENT MEDS FT
MEDICATIONS  (STANDING):  acetaminophen   IVPB .. 1000 milliGRAM(s) IV Intermittent once  dextrose 5% + sodium chloride 0.9% 1000 milliLiter(s) (125 mL/Hr) IV Continuous <Continuous>  dextrose 5% + sodium chloride 0.9%. 1000 milliLiter(s) (125 mL/Hr) IV Continuous <Continuous>  diphenhydrAMINE 25 milliGRAM(s) Oral every 4 hours  famotidine Injectable 20 milliGRAM(s) IV Push two times a day  heparin   Injectable 5000 Unit(s) SubCutaneous every 12 hours  pyridoxine 25 milliGRAM(s) Oral <User Schedule>    MEDICATIONS  (PRN):

## 2023-03-16 NOTE — DIETITIAN INITIAL EVALUATION ADULT - ORAL INTAKE PTA/DIET HISTORY
Pt reports had good appetite and PO intake PTA.   Reports not following specific diet/ diet restrictions PTA and confirmed no known food allergies/ food intolerances

## 2023-03-16 NOTE — DIETITIAN INITIAL EVALUATION ADULT - PERTINENT LABORATORY DATA
03-16    137  |  106  |  6<L>  ----------------------------<  84  3.4<L>   |  22  |  0.56    Ca    8.5      16 Mar 2023 06:31    03-16 @ 06:31: Na 137, BUN 6<L>, Cr 0.56, BG 84, K+ 3.4<L>, Phos --, Mg --, Alk Phos --, ALT/SGPT --, AST/SGOT --, HbA1c --  03-15 @ 21:22: Na --, BUN --, Cr --, BG --, K+ 3.6, Phos --, Mg --, Alk Phos --, ALT/SGPT --, AST/SGOT --, HbA1c --

## 2023-03-16 NOTE — DIETITIAN INITIAL EVALUATION ADULT - OTHER INFO
Home Medications:  acetaminophen 325 mg oral tablet: 3 tab(s) orally every 6 hours (09 Sep 2017 11:52)  ascorbic acid 250 mg oral tablet: 1 tab(s) orally 3 times a day (09 Sep 2017 11:52)  ibuprofen 600 mg oral tablet: 1 tab(s) orally every 6 hours (09 Sep 2017 11:52)

## 2023-03-16 NOTE — DIETITIAN INITIAL EVALUATION ADULT - NSFNSGIIOFT_GEN_A_CORE
- Pt admitted with nausea, vomiting reports now improved; last vomit was 3/14, last nausea this am; denies diarrhea, or constipation.   - Last BM:today, per pt; not currently ordered for bowel regimen

## 2023-03-17 ENCOUNTER — TRANSCRIPTION ENCOUNTER (OUTPATIENT)
Age: 28
End: 2023-03-17

## 2023-03-17 VITALS
SYSTOLIC BLOOD PRESSURE: 111 MMHG | TEMPERATURE: 98 F | HEART RATE: 76 BPM | DIASTOLIC BLOOD PRESSURE: 72 MMHG | RESPIRATION RATE: 18 BRPM | OXYGEN SATURATION: 99 %

## 2023-03-17 PROCEDURE — ZZZZZ: CPT

## 2023-03-17 PROCEDURE — 86901 BLOOD TYPING SEROLOGIC RH(D): CPT

## 2023-03-17 PROCEDURE — 80053 COMPREHEN METABOLIC PANEL: CPT

## 2023-03-17 PROCEDURE — 87086 URINE CULTURE/COLONY COUNT: CPT

## 2023-03-17 PROCEDURE — 87637 SARSCOV2&INF A&B&RSV AMP PRB: CPT

## 2023-03-17 PROCEDURE — 82435 ASSAY OF BLOOD CHLORIDE: CPT

## 2023-03-17 PROCEDURE — 84132 ASSAY OF SERUM POTASSIUM: CPT

## 2023-03-17 PROCEDURE — 83605 ASSAY OF LACTIC ACID: CPT

## 2023-03-17 PROCEDURE — 82803 BLOOD GASES ANY COMBINATION: CPT

## 2023-03-17 PROCEDURE — 82010 KETONE BODYS QUAN: CPT

## 2023-03-17 PROCEDURE — 99238 HOSP IP/OBS DSCHRG MGMT 30/<: CPT

## 2023-03-17 PROCEDURE — 80048 BASIC METABOLIC PNL TOTAL CA: CPT

## 2023-03-17 PROCEDURE — 36415 COLL VENOUS BLD VENIPUNCTURE: CPT

## 2023-03-17 PROCEDURE — 86850 RBC ANTIBODY SCREEN: CPT

## 2023-03-17 PROCEDURE — 83735 ASSAY OF MAGNESIUM: CPT

## 2023-03-17 PROCEDURE — 85014 HEMATOCRIT: CPT

## 2023-03-17 PROCEDURE — 84100 ASSAY OF PHOSPHORUS: CPT

## 2023-03-17 PROCEDURE — 81001 URINALYSIS AUTO W/SCOPE: CPT

## 2023-03-17 PROCEDURE — 82330 ASSAY OF CALCIUM: CPT

## 2023-03-17 PROCEDURE — 85018 HEMOGLOBIN: CPT

## 2023-03-17 PROCEDURE — 84702 CHORIONIC GONADOTROPIN TEST: CPT

## 2023-03-17 PROCEDURE — 82947 ASSAY GLUCOSE BLOOD QUANT: CPT

## 2023-03-17 PROCEDURE — 84295 ASSAY OF SERUM SODIUM: CPT

## 2023-03-17 PROCEDURE — 99285 EMERGENCY DEPT VISIT HI MDM: CPT

## 2023-03-17 PROCEDURE — 85025 COMPLETE CBC W/AUTO DIFF WBC: CPT

## 2023-03-17 PROCEDURE — 86900 BLOOD TYPING SEROLOGIC ABO: CPT

## 2023-03-17 PROCEDURE — 87186 SC STD MICRODIL/AGAR DIL: CPT

## 2023-03-17 RX ORDER — NITROFURANTOIN MACROCRYSTAL 50 MG
1 CAPSULE ORAL
Qty: 12 | Refills: 0
Start: 2023-03-17 | End: 2023-03-22

## 2023-03-17 RX ORDER — NITROFURANTOIN MACROCRYSTAL 50 MG
100 CAPSULE ORAL
Refills: 0 | Status: DISCONTINUED | OUTPATIENT
Start: 2023-03-17 | End: 2023-03-17

## 2023-03-17 RX ORDER — DOXYLAMINE SUCCINATE AND PYRIDOXINE HYDROCHLORIDE, DELAYED RELEASE TABLETS 10 MG/10 MG 10; 10 MG/1; MG/1
2 TABLET, DELAYED RELEASE ORAL
Qty: 60 | Refills: 3
Start: 2023-03-17 | End: 2023-07-14

## 2023-03-17 RX ORDER — DIPHENHYDRAMINE HCL 50 MG
1 CAPSULE ORAL
Qty: 120 | Refills: 0
Start: 2023-03-17 | End: 2023-04-15

## 2023-03-17 RX ADMIN — Medication 25 MILLIGRAM(S): at 08:19

## 2023-03-17 RX ADMIN — Medication 25 MILLIGRAM(S): at 01:38

## 2023-03-17 RX ADMIN — Medication 100 MILLIGRAM(S): at 09:27

## 2023-03-17 RX ADMIN — Medication 25 MILLIGRAM(S): at 08:18

## 2023-03-17 RX ADMIN — Medication 25 MILLIGRAM(S): at 00:03

## 2023-03-17 RX ADMIN — Medication 25 MILLIGRAM(S): at 13:18

## 2023-03-17 RX ADMIN — Medication 25 MILLIGRAM(S): at 13:19

## 2023-03-17 RX ADMIN — FAMOTIDINE 20 MILLIGRAM(S): 10 INJECTION INTRAVENOUS at 12:35

## 2023-03-17 RX ADMIN — HEPARIN SODIUM 5000 UNIT(S): 5000 INJECTION INTRAVENOUS; SUBCUTANEOUS at 09:28

## 2023-03-17 NOTE — CHART NOTE - NSCHARTNOTEFT_GEN_A_CORE
R3 Chart Note     Patient seen at bedside for FHR check. Patient reports continued improvement of symptoms and is aware of upcoming appointment 4/4.     TAUS: FHR confirmed of 134.     - patient to be discharged     Kellie Ruggiero PGY-3

## 2023-03-17 NOTE — DISCHARGE NOTE ANTEPARTUM - PLAN OF CARE
Patient was admitted due to worsening nausea and vomiting in pregnancy. She was given IV anti-emetics and had relief of symptoms. On HD#4 patient was tolerating po with no further episodes of emesis. She was discharged with instructions to follow up with OB.

## 2023-03-17 NOTE — DISCHARGE NOTE ANTEPARTUM - NS MD DC FALL RISK RISK
For information on Fall & Injury Prevention, visit: [email or web address] OR  [email or web address] OR  https://www.cdc.gov/steadi/patient.html

## 2023-03-17 NOTE — DISCHARGE NOTE ANTEPARTUM - PATIENT PORTAL LINK FT
You can access the FollowMyHealth Patient Portal offered by [facility] by registering at the following website: [email or web address]. By joining [employer]’s FollowMyHealth portal, you will also be able to view your health information using other applications (apps) compatible with our system.

## 2023-03-17 NOTE — DISCHARGE NOTE ANTEPARTUM - CARE PLAN
1 Principal Discharge DX:	Hyperemesis gravidarum  Assessment and plan of treatment:	Patient was admitted due to worsening nausea and vomiting in pregnancy. She was given IV anti-emetics and had relief of symptoms. On HD#4 patient was tolerating po with no further episodes of emesis. She was discharged with instructions to follow up with OB.

## 2023-03-17 NOTE — DISCHARGE NOTE ANTEPARTUM - PROVIDER TOKENS
PROVIDER:[TOKEN:[[id number]],SCHEDULEDAPPT:[04/04/2023],SCHEDULEDAPPTTIME:[10:45 AM],ESTABLISHEDPATIENT:[T]]

## 2023-03-17 NOTE — PROGRESS NOTE ADULT - SUBJECTIVE AND OBJECTIVE BOX
R3 Antepartum Progress Note  HD#4     Patient seen and examined at bedside, no acute overnight events. No acute complaints. Patient endorses denies any loss of fluid or contractions. Patient is ambulating and tolerating regular diet. She has had no further episodes of vomiting. Denies CP, SOB, N/V, fevers, chills, or any other concerns.    Vital Signs Last 24 Hours  T(C): 36.6 (03-17-23 @ 08:34), Max: 37.1 (03-16-23 @ 13:34)  HR: 76 (03-17-23 @ 08:34) (60 - 80)  BP: 111/72 (03-17-23 @ 08:34) (97/62 - 111/72)  RR: 18 (03-17-23 @ 08:34) (18 - 18)  SpO2: 99% (03-17-23 @ 08:34) (98% - 100%)    I&O's Summary      Physical Exam:  General: NAD  CV: RR  Lungs: breathing comfortably on RA  Abdomen: soft, gravid, non-tender  Ext: no pain or swelling      Labs:             10.2<L>  10.76<H> )-----------( 208      ( 03-16 @ 06:32 )             31.2<L>               11.0<L>  11.77<H> )-----------( 226      ( 03-15 @ 06:56 )             33.9<L>               14.5   17.15<H> )-----------( 320      ( 03-14 @ 11:25 )             43.5         MEDICATIONS  (STANDING):  acetaminophen   IVPB .. 1000 milliGRAM(s) IV Intermittent once  dextrose 5% + sodium chloride 0.9% 1000 milliLiter(s) (125 mL/Hr) IV Continuous <Continuous>  dextrose 5% + sodium chloride 0.9%. 1000 milliLiter(s) (125 mL/Hr) IV Continuous <Continuous>  diphenhydrAMINE 25 milliGRAM(s) Oral every 6 hours  famotidine    Tablet 20 milliGRAM(s) Oral daily  heparin   Injectable 5000 Unit(s) SubCutaneous every 12 hours  nitrofurantoin monohydrate/macrocrystals (MACROBID) 100 milliGRAM(s) Oral two times a day  pyridoxine 25 milliGRAM(s) Oral <User Schedule>    MEDICATIONS  (PRN):  
R3 Antepartum Progress Note  HD#3    Patient seen and examined at bedside, no acute overnight events. No acute complaints. Patient denies any loss of fluid, contractions. She reports she was able to tolerate full liquid diet yesterday. Had episode of nausea recently, now resolved. Patient is ambulating. Denies CP, SOB, N/V, fevers, chills, or any other concerns.    Vital Signs Last 24 Hours  T(C): 36.9 (03-16-23 @ 04:56), Max: 36.9 (03-15-23 @ 17:00)  HR: 72 (03-16-23 @ 04:56) (72 - 81)  BP: 109/74 (03-16-23 @ 04:56) (97/62 - 109/74)  RR: 19 (03-16-23 @ 04:56) (18 - 19)  SpO2: 98% (03-16-23 @ 04:56) (98% - 100%)    I&O's Summary    14 Mar 2023 07:01  -  15 Mar 2023 07:00  --------------------------------------------------------  IN: 165 mL / OUT: 425 mL / NET: -260 mL    15 Mar 2023 07:01  -  16 Mar 2023 06:44  --------------------------------------------------------  IN: 2990 mL / OUT: 400 mL / NET: 2590 mL        Physical Exam:  General: NAD  CV: RR  Lungs: breathing comfortably on RA  Abdomen: soft, gravid, non-tender  Ext: no pain or swelling    Labs:             11.0<L>  11.77<H> )-----------( 226      ( 03-15 @ 06:56 )             33.9<L>               14.5   17.15<H> )-----------( 320      ( 03-14 @ 11:25 )             43.5         MEDICATIONS  (STANDING):  acetaminophen   IVPB .. 1000 milliGRAM(s) IV Intermittent once  dextrose 5% + sodium chloride 0.9% 1000 milliLiter(s) (125 mL/Hr) IV Continuous <Continuous>  dextrose 5% + sodium chloride 0.9%. 1000 milliLiter(s) (125 mL/Hr) IV Continuous <Continuous>  diphenhydrAMINE Injectable 25 milliGRAM(s) IV Push every 6 hours  famotidine Injectable 20 milliGRAM(s) IV Push two times a day  heparin   Injectable 5000 Unit(s) SubCutaneous every 12 hours  ondansetron Injectable 4 milliGRAM(s) IV Push every 6 hours  pyridoxine Injectable 50 milliGRAM(s) IV Push <User Schedule>    MEDICATIONS  (PRN):  metoclopramide Injectable 10 milliGRAM(s) IV Push every 6 hours PRN Nausea and/or Vomiting related to pregnancy  
R3 Antepartum Progress Note  HD#2    Patient seen and examined at bedside, no acute overnight events. Reports she has been able to tolerate sips without vomiting. She denies any cramping, fevers, chills, diarrhea.     Vital Signs Last 24 Hours  T(C): 36.8 (03-15-23 @ 05:30), Max: 37.2 (03-14-23 @ 16:00)  HR: 72 (03-15-23 @ 05:30) (61 - 75)  BP: 100/62 (03-15-23 @ 05:30) (99/62 - 117/74)  RR: 18 (03-15-23 @ 05:30) (16 - 20)  SpO2: 98% (03-15-23 @ 05:30) (98% - 100%)    I&O's Summary    14 Mar 2023 07:01  -  15 Mar 2023 07:00  --------------------------------------------------------  IN: 165 mL / OUT: 425 mL / NET: -260 mL        Physical Exam:  General: NAD  CV: RR  Lungs: breathing comfortably on RA  Abdomen: soft, gravid, non-tender  Ext: no pain or swelling    Labs:             11.0<L>  11.77<H> )-----------( 226      ( 03-15 @ 06:56 )             33.9<L>               14.5   17.15<H> )-----------( 320      ( 03-14 @ 11:25 )             43.5         MEDICATIONS  (STANDING):  acetaminophen   IVPB .. 1000 milliGRAM(s) IV Intermittent once  dextrose 5% + sodium chloride 0.9% 1000 milliLiter(s) (125 mL/Hr) IV Continuous <Continuous>  dextrose 5% + sodium chloride 0.9%. 1000 milliLiter(s) (125 mL/Hr) IV Continuous <Continuous>  diphenhydrAMINE Injectable 25 milliGRAM(s) IV Push every 6 hours  famotidine Injectable 20 milliGRAM(s) IV Push two times a day  heparin   Injectable 5000 Unit(s) SubCutaneous every 12 hours  ondansetron Injectable 4 milliGRAM(s) IV Push every 6 hours  pyridoxine Injectable 50 milliGRAM(s) IV Push <User Schedule>    MEDICATIONS  (PRN):  metoclopramide Injectable 10 milliGRAM(s) IV Push every 6 hours PRN Nausea and/or Vomiting related to pregnancy

## 2023-03-17 NOTE — DISCHARGE NOTE ANTEPARTUM - CARE PROVIDER_API CALL
Cyndy Ventura)  Obstetrics and Gynecology  [address], [address]  [place] [address]  Phone: [phone]  Fax: [phone]  Established Patient  Scheduled Appointment: 04/04/2023 10:45 AM

## 2023-03-17 NOTE — DISCHARGE NOTE ANTEPARTUM - MEDICATION SUMMARY - MEDICATIONS TO TAKE
I will START or STAY ON the medications listed below when I get home from the hospital:    doxylamine-pyridoxine 10 mg-10 mg oral delayed release tablet  -- 2 tab(s) by mouth once a day (at bedtime). If symptoms worsen you can take up to four (one tablet in the morning, one in afternoon, two at bedtime)  -- Do not chew, break, or crush.  Do not drink alcoholic beverages when taking this medication.  May cause drowsiness.  Alcohol may intensify this effect.  Use care when operating dangerous machinery.  Some non-prescription drugs may aggravate your condition.  Read all labels carefully.  If a warning appears, check with your doctor before taking.  Swallow whole.  Do not crush.  Take medication on an empty stomach 1 hour before or 2 to 3 hours after a meal unless otherwise directed by your doctor.  This drug may impair the ability to drive or operate machinery.  Use care until you become familiar with its effects.    -- Indication: For nausea and/or vomiting    Benadryl 25 mg oral tablet  -- 1 tab(s) by mouth every 6 hours if the pyridoxine-doxylamine is not providing symptoms relief.  -- May cause drowsiness.  Alcohol may intensify this effect.  Use care when operating dangerous machinery.  Obtain medical advice before taking any non-prescription drugs as some may affect the action of this medication.    -- Indication: For nausea and/or vomiting    ferrous sulfate 325 mg (65 mg elemental iron) oral tablet  -- 1 tab(s) by mouth 3 times a day  -- Indication: For anemia    Macrobid 100 mg oral capsule  -- 1 cap(s) by mouth every 12 hours   -- Finish all this medication unless otherwise directed by prescriber.  May discolor urine or feces.  Take with food or milk.    -- Indication: For urinary tract infection

## 2023-03-17 NOTE — PROGRESS NOTE ADULT - ATTENDING COMMENTS
P1 @ 7 wks by early cristina silva N/V improved now on Diclegis  -notes similar in last preg     ICU Vital Signs Last 24 Hrs  T(C): 36.6 (17 Mar 2023 08:34), Max: 37.1 (16 Mar 2023 13:34)  T(F): 97.8 (17 Mar 2023 08:34), Max: 98.8 (16 Mar 2023 13:34)  HR: 76 (17 Mar 2023 08:34) (60 - 80)  BP: 111/72 (17 Mar 2023 08:34) (97/62 - 111/72)  RR: 18 (17 Mar 2023 08:34) (18 - 18)  SpO2: 99% (17 Mar 2023 08:34) (98% - 100%)    O2 Parameters below as of 17 Mar 2023 08:34  Patient On (Oxygen Delivery Method): room air    03-16    137  |  106  |  6<L>  ----------------------------<  84  3.4<L>   |  22  |  0.56    Ca    8.5      16 Mar 2023 06:31              pt doing well, tolerating solids  stable for discharge home  f/u  appt in early april  discussed diet, activity, hydration and options for additional medication if indicated    BARRY Mejia MD  attending
Agree with above, Patient seen by me Patient 28 y/o P1 at 6+wks EGA with hyperemesis. Patient on IV zofran, reglan, pyridoxine, benadryl, pepcid, and daily banana bag. patient tolerating some solids, not currently vomiting. Consider discontinuing zofran today, and follow diclegis regimen orally  and monitor if can tolerate a regular diet. Continue to monitor and consider discharge home later today or tomorrow.  Siobhan Ro MD

## 2023-03-17 NOTE — DISCHARGE NOTE ANTEPARTUM - ADDITIONAL INSTRUCTIONS
Continue to take medications as needed for nausea and vomiting. Start with Pyridoxine-Doxylamine two tablets and night time. If you still have symptoms you can take this up to four times a day (1 x in morning, 1 x in afternoon, 2 at bedtime). If this still does not provide relief, then you can take Benadryl every 6 hours. You were found to have a urinary tract infection and antibiotics have been sent to your pharmacy which you should continue to take every 12 hours until finished. Please call the office if you ever have worsening vomiting, fevers of 100.4 F or more, vaginal bleeding, leakage of fluids.

## 2023-03-17 NOTE — PROGRESS NOTE ADULT - ASSESSMENT
28 yo  @ 6w6d (by LMP confirmed by first trimester US, TRISTIAN: 23) admitted with nausea and vomiting for 9 days and labs suggesting ketosis. Patient now able to tolerate sips with current regimen.     #Hyperemesis  -IV fluid repletion with additives: multivitamins + D5NaCl. Continue to monitor I+Os   -Zofran 4 mg IV q6hrs prn, Reglan 10 mg IV q6hrs prn, Benadryl 25 q6h, Pyridoxine 50 TID  -Viable pregnancy confirmed in office 3/14  -PO challenge  - Pt with brown bilious emesis, GI to be consulted  - f/u AM CBC/BMP     #Maternal well being   - HSQ + ambulating for DVT ppx   - Reg diet   - f/u UCx   - Dietitian consultation     Kellie Ruggiero PGY-3
28 yo  @7w1d (by LMP confirmed by first trimester US, TRISTIAN: 23) admitted with nausea and vomiting for 9 days and labs suggesting ketosis. Patient now able to tolerate full liquid diet.     #Hyperemesis  - Tolerating po Pyridoxine, Pepcid, Benadryl  -Viable pregnancy confirmed in office 3/14  - passed PO challenge  - Pt with brown bilious emesis, appreciate GI recs: c/w pepcid, regular diet, monitor CBC, if episode of hematemesis, melena, or another episode of CGE, re-consult    #Maternal well being   - HSQ + ambulating for DVT ppx   - Reg diet   - UCx (3/14): > 100K E. Coli, will begin Macrobid, patient asymptomatic   - Dietitian consultation     Kellie Ruggiero PGY-3
26 yo  @7w (by LMP confirmed by first trimester US, TRISTIAN: 23) admitted with nausea and vomiting for 9 days and labs suggesting ketosis. Patient now able to tolerate full liquid diet.     #Hyperemesis  -IV fluid repletion with additives: multivitamins + D5NaCl. Continue to monitor I+Os   -Zofran 4 mg IV q6hrs prn, Reglan 10 mg IV q6hrs prn, Benadryl 25 q6h, Pyridoxine 50 TID  -Viable pregnancy confirmed in office 3/14  -PO challenge  - Pt with brown bilious emesis, appreciate GI recs: c/w pepcid, regular diet, monitor CBC, if episode of hematemesis, melena, or another episode of CGE, re-consult  - f/u AM CBC/BMP, replete prn     #Maternal well being   - HSQ + ambulating for DVT ppx   - Reg diet   - f/u UCx(3/14)  - Dietitian consultation     Kellie Ruggiero PGY-3

## 2023-03-17 NOTE — DISCHARGE NOTE ANTEPARTUM - HOSPITAL COURSE
27 y.o.  presenting at 6w5d due to intractable nausea and vomiting in recently found IUP. She was admitted for management and further monitoring. She was given IV antiemetics and was repleted with Potassium. On HD#3 she was transitioned from IV to po anti emetics with continued relief of symptoms. UCx performed showed > 100K E. coli and so was given Macrobid. She was discharged HD#4 in stable condition and tolerating po on po anti-emetics. She was instructed to follow up with her OB closely and to continue antibiotics.

## 2023-03-19 ENCOUNTER — EMERGENCY (EMERGENCY)
Facility: HOSPITAL | Age: 28
LOS: 1 days | Discharge: HOME | End: 2023-03-19
Attending: STUDENT IN AN ORGANIZED HEALTH CARE EDUCATION/TRAINING PROGRAM
Payer: COMMERCIAL

## 2023-03-19 VITALS
DIASTOLIC BLOOD PRESSURE: 88 MMHG | TEMPERATURE: 98 F | HEIGHT: 65 IN | WEIGHT: 160.06 LBS | HEART RATE: 87 BPM | OXYGEN SATURATION: 98 % | RESPIRATION RATE: 20 BRPM | SYSTOLIC BLOOD PRESSURE: 119 MMHG

## 2023-03-19 DIAGNOSIS — N83.20 UNSPECIFIED OVARIAN CYSTS: Chronic | ICD-10-CM

## 2023-03-19 LAB
ALBUMIN SERPL ELPH-MCNC: 5 G/DL — SIGNIFICANT CHANGE UP (ref 3.3–5)
ALP SERPL-CCNC: 65 U/L — SIGNIFICANT CHANGE UP (ref 40–120)
ALT FLD-CCNC: 58 U/L — HIGH (ref 10–45)
ANION GAP SERPL CALC-SCNC: 18 MMOL/L — HIGH (ref 5–17)
APPEARANCE UR: ABNORMAL
AST SERPL-CCNC: 55 U/L — HIGH (ref 10–40)
BACTERIA # UR AUTO: ABNORMAL
BASE EXCESS BLDV CALC-SCNC: -0.3 MMOL/L — SIGNIFICANT CHANGE UP (ref -2–3)
BASOPHILS # BLD AUTO: 0 K/UL — SIGNIFICANT CHANGE UP (ref 0–0.2)
BASOPHILS NFR BLD AUTO: 0 % — SIGNIFICANT CHANGE UP (ref 0–2)
BILIRUB SERPL-MCNC: 0.5 MG/DL — SIGNIFICANT CHANGE UP (ref 0.2–1.2)
BILIRUB UR-MCNC: NEGATIVE — SIGNIFICANT CHANGE UP
BUN SERPL-MCNC: 16 MG/DL — SIGNIFICANT CHANGE UP (ref 7–23)
CA-I SERPL-SCNC: 1.22 MMOL/L — SIGNIFICANT CHANGE UP (ref 1.15–1.33)
CALCIUM SERPL-MCNC: 10.4 MG/DL — SIGNIFICANT CHANGE UP (ref 8.4–10.5)
CHLORIDE BLDV-SCNC: 99 MMOL/L — SIGNIFICANT CHANGE UP (ref 96–108)
CHLORIDE SERPL-SCNC: 97 MMOL/L — SIGNIFICANT CHANGE UP (ref 96–108)
CO2 BLDV-SCNC: 25 MMOL/L — SIGNIFICANT CHANGE UP (ref 22–26)
CO2 SERPL-SCNC: 21 MMOL/L — LOW (ref 22–31)
COLOR SPEC: YELLOW — SIGNIFICANT CHANGE UP
CREAT SERPL-MCNC: 0.57 MG/DL — SIGNIFICANT CHANGE UP (ref 0.5–1.3)
DIFF PNL FLD: NEGATIVE — SIGNIFICANT CHANGE UP
EGFR: 126 ML/MIN/1.73M2 — SIGNIFICANT CHANGE UP
EOSINOPHIL # BLD AUTO: 0 K/UL — SIGNIFICANT CHANGE UP (ref 0–0.5)
EOSINOPHIL NFR BLD AUTO: 0 % — SIGNIFICANT CHANGE UP (ref 0–6)
EPI CELLS # UR: 7 /HPF — HIGH
FLUAV AG NPH QL: SIGNIFICANT CHANGE UP
FLUBV AG NPH QL: SIGNIFICANT CHANGE UP
GAS PNL BLDV: 131 MMOL/L — LOW (ref 136–145)
GAS PNL BLDV: SIGNIFICANT CHANGE UP
GAS PNL BLDV: SIGNIFICANT CHANGE UP
GLUCOSE BLDV-MCNC: 96 MG/DL — SIGNIFICANT CHANGE UP (ref 70–99)
GLUCOSE SERPL-MCNC: 98 MG/DL — SIGNIFICANT CHANGE UP (ref 70–99)
GLUCOSE UR QL: NEGATIVE — SIGNIFICANT CHANGE UP
HCG SERPL-ACNC: HIGH MIU/ML
HCO3 BLDV-SCNC: 24 MMOL/L — SIGNIFICANT CHANGE UP (ref 22–29)
HCT VFR BLD CALC: 44.3 % — SIGNIFICANT CHANGE UP (ref 34.5–45)
HCT VFR BLDA CALC: 46 % — SIGNIFICANT CHANGE UP (ref 34.5–46.5)
HGB BLD CALC-MCNC: 15.4 G/DL — SIGNIFICANT CHANGE UP (ref 11.7–16.1)
HGB BLD-MCNC: 14.8 G/DL — SIGNIFICANT CHANGE UP (ref 11.5–15.5)
HYALINE CASTS # UR AUTO: 12 /LPF — HIGH (ref 0–2)
KETONES UR-MCNC: ABNORMAL
LACTATE BLDV-MCNC: 3.1 MMOL/L — HIGH (ref 0.5–2)
LEUKOCYTE ESTERASE UR-ACNC: ABNORMAL
LYMPHOCYTES # BLD AUTO: 14.8 % — SIGNIFICANT CHANGE UP (ref 13–44)
LYMPHOCYTES # BLD AUTO: 2.81 K/UL — SIGNIFICANT CHANGE UP (ref 1–3.3)
MANUAL SMEAR VERIFICATION: SIGNIFICANT CHANGE UP
MCHC RBC-ENTMCNC: 28.7 PG — SIGNIFICANT CHANGE UP (ref 27–34)
MCHC RBC-ENTMCNC: 33.4 GM/DL — SIGNIFICANT CHANGE UP (ref 32–36)
MCV RBC AUTO: 86 FL — SIGNIFICANT CHANGE UP (ref 80–100)
MONOCYTES # BLD AUTO: 1.31 K/UL — HIGH (ref 0–0.9)
MONOCYTES NFR BLD AUTO: 6.9 % — SIGNIFICANT CHANGE UP (ref 2–14)
NEUTROPHILS # BLD AUTO: 14.86 K/UL — HIGH (ref 1.8–7.4)
NEUTROPHILS NFR BLD AUTO: 78.3 % — HIGH (ref 43–77)
NITRITE UR-MCNC: NEGATIVE — SIGNIFICANT CHANGE UP
OVALOCYTES BLD QL SMEAR: SLIGHT — SIGNIFICANT CHANGE UP
PCO2 BLDV: 38 MMHG — LOW (ref 39–42)
PH BLDV: 7.41 — SIGNIFICANT CHANGE UP (ref 7.32–7.43)
PH UR: 6.5 — SIGNIFICANT CHANGE UP (ref 5–8)
PLAT MORPH BLD: ABNORMAL
PLATELET # BLD AUTO: 292 K/UL — SIGNIFICANT CHANGE UP (ref 150–400)
PO2 BLDV: 26 MMHG — SIGNIFICANT CHANGE UP (ref 25–45)
POIKILOCYTOSIS BLD QL AUTO: SLIGHT — SIGNIFICANT CHANGE UP
POTASSIUM BLDV-SCNC: 5 MMOL/L — SIGNIFICANT CHANGE UP (ref 3.5–5.1)
POTASSIUM SERPL-MCNC: 4.1 MMOL/L — SIGNIFICANT CHANGE UP (ref 3.5–5.3)
POTASSIUM SERPL-SCNC: 4.1 MMOL/L — SIGNIFICANT CHANGE UP (ref 3.5–5.3)
PROT SERPL-MCNC: 8.7 G/DL — HIGH (ref 6–8.3)
PROT UR-MCNC: ABNORMAL
RBC # BLD: 5.15 M/UL — SIGNIFICANT CHANGE UP (ref 3.8–5.2)
RBC # FLD: 15.4 % — HIGH (ref 10.3–14.5)
RBC BLD AUTO: ABNORMAL
RBC CASTS # UR COMP ASSIST: 1 /HPF — SIGNIFICANT CHANGE UP (ref 0–4)
RSV RNA NPH QL NAA+NON-PROBE: SIGNIFICANT CHANGE UP
SAO2 % BLDV: 37.8 % — LOW (ref 67–88)
SARS-COV-2 RNA SPEC QL NAA+PROBE: SIGNIFICANT CHANGE UP
SODIUM SERPL-SCNC: 136 MMOL/L — SIGNIFICANT CHANGE UP (ref 135–145)
SP GR SPEC: 1.04 — HIGH (ref 1.01–1.02)
UROBILINOGEN FLD QL: ABNORMAL
WBC # BLD: 18.98 K/UL — HIGH (ref 3.8–10.5)
WBC # FLD AUTO: 18.98 K/UL — HIGH (ref 3.8–10.5)
WBC UR QL: 46 /HPF — HIGH (ref 0–5)

## 2023-03-19 PROCEDURE — 76770 US EXAM ABDO BACK WALL COMP: CPT | Mod: 26

## 2023-03-19 PROCEDURE — 99285 EMERGENCY DEPT VISIT HI MDM: CPT

## 2023-03-19 RX ORDER — CEFTRIAXONE 500 MG/1
1000 INJECTION, POWDER, FOR SOLUTION INTRAMUSCULAR; INTRAVENOUS ONCE
Refills: 0 | Status: COMPLETED | OUTPATIENT
Start: 2023-03-19 | End: 2023-03-19

## 2023-03-19 RX ORDER — ONDANSETRON 8 MG/1
4 TABLET, FILM COATED ORAL ONCE
Refills: 0 | Status: COMPLETED | OUTPATIENT
Start: 2023-03-19 | End: 2023-03-19

## 2023-03-19 RX ORDER — DIPHENHYDRAMINE HCL 50 MG
25 CAPSULE ORAL ONCE
Refills: 0 | Status: COMPLETED | OUTPATIENT
Start: 2023-03-19 | End: 2023-03-19

## 2023-03-19 RX ORDER — SODIUM CHLORIDE 9 MG/ML
1000 INJECTION, SOLUTION INTRAVENOUS
Refills: 0 | Status: DISCONTINUED | OUTPATIENT
Start: 2023-03-19 | End: 2023-03-20

## 2023-03-19 RX ORDER — SODIUM CHLORIDE 9 MG/ML
1000 INJECTION INTRAMUSCULAR; INTRAVENOUS; SUBCUTANEOUS ONCE
Refills: 0 | Status: COMPLETED | OUTPATIENT
Start: 2023-03-19 | End: 2023-03-19

## 2023-03-19 RX ORDER — PYRIDOXINE HCL (VITAMIN B6) 100 MG
50 TABLET ORAL ONCE
Refills: 0 | Status: COMPLETED | OUTPATIENT
Start: 2023-03-19 | End: 2023-03-19

## 2023-03-19 RX ADMIN — Medication 25 MILLIGRAM(S): at 18:32

## 2023-03-19 RX ADMIN — Medication 50 MILLIGRAM(S): at 18:42

## 2023-03-19 RX ADMIN — ONDANSETRON 4 MILLIGRAM(S): 8 TABLET, FILM COATED ORAL at 18:44

## 2023-03-19 RX ADMIN — SODIUM CHLORIDE 100 MILLILITER(S): 9 INJECTION, SOLUTION INTRAVENOUS at 18:33

## 2023-03-19 RX ADMIN — CEFTRIAXONE 100 MILLIGRAM(S): 500 INJECTION, POWDER, FOR SOLUTION INTRAMUSCULAR; INTRAVENOUS at 23:53

## 2023-03-19 RX ADMIN — SODIUM CHLORIDE 1000 MILLILITER(S): 9 INJECTION INTRAMUSCULAR; INTRAVENOUS; SUBCUTANEOUS at 18:43

## 2023-03-19 RX ADMIN — SODIUM CHLORIDE 1000 MILLILITER(S): 9 INJECTION INTRAMUSCULAR; INTRAVENOUS; SUBCUTANEOUS at 18:42

## 2023-03-19 NOTE — ED PROVIDER NOTE - CONSIDERATION OF ADMISSION OBSERVATION
+ vomits, no PO, given her extensive history of hyperemesis and multiple admission in the past, likely admission for hydration Consideration of Admission/Observation

## 2023-03-19 NOTE — ED PROVIDER NOTE - ATTENDING CONTRIBUTION TO CARE
I have personally seen and examined this patient.  I have fully participated in the care of this patient. I performed a substantive portion of the visit including all aspects of the medical decision making. I have reviewed all pertinent clinical information, including history, physical exam, plan and the Resident’s note and agree except as noted. - MD Kimberly.

## 2023-03-19 NOTE — ED PROVIDER NOTE - OBJECTIVE STATEMENT
27-year-old female with history of hyperemesis gravidarum presenting with vomiting.  Patient is 8 weeks pregnant confirmed on ultrasound.  Patient was admitted a couple days ago to OB/GYN for hyperemesis gravidarum, felt better and was tolerating p.o.  However when she went home, she started vomiting again. 27-year-old female with history of hyperemesis gravidarum presenting with vomiting.  Patient is 8 weeks pregnant confirmed on ultrasound.  Patient was admitted a couple days ago to OB/GYN for hyperemesis gravidarum, felt better and was tolerating p.o.  However when she went home, she started vomiting again. Patient also found to have UTI, taking Macrobid.  Has been taking Diclegis without improvement.  No fever, chest pain, shortness of breath, abdominal pain. Pt also had hyperemesis gravidarum during first pregnancy.

## 2023-03-19 NOTE — ED PROVIDER NOTE - PROGRESS NOTE DETAILS
Pt signed out to me stable, reassessed, nausea improved however still feeling unwell overall, still unable to tolerate PO, ultrasound results showing no concern for pyelo, OB paged, pending admission for hyperemesis gravidarum. - Alcira Reid, PGY-2 Attending/MD Kimberly. pending dispo, pt received all recc by GYN team, needs reliable source of abx to treat uti, which may contribute to worsening sickness, preferably IV abx due to hyperemesis, admission vs. cdu, awaiting for GYN call back. the pt was sigend out to Elidia Mclain.

## 2023-03-19 NOTE — ED ADULT TRIAGE NOTE - CHIEF COMPLAINT QUOTE
hyperemesis, 2 mos preg, - taking antiemetics without relief, recently admitted/dced home for same c/o

## 2023-03-19 NOTE — ED PROVIDER NOTE - CLINICAL SUMMARY MEDICAL DECISION MAKING FREE TEXT BOX
27-year-old female with history of hyperemesis gravidarum presenting with vomiting.  Patient is 8 weeks pregnant confirmed on ultrasound.  Patient was admitted a couple days ago to OB/GYN for hyperemesis gravidarum, felt better and was tolerating p.o.  However when she went home, she started vomiting again. Patient also found to have UTI, taking Macrobid.  Has been taking Diclegis without improvement.  No fever, chest pain, shortness of breath, abdominal pain. Pt also had hyperemesis gravidarum during first pregnancy. Exam shows uncomfortable appearing pt, no abd ttp. Likely hyperemesis gravidarum. Will get labs, urine, antiemetics, fluids.

## 2023-03-19 NOTE — CONSULT NOTE ADULT - SUBJECTIVE AND OBJECTIVE BOX
EFRAIN HAWTHORNE  27y  Female [id number]    HPI:  28 y/o  7w2d (TRISTIAN 23 by LMP/first trimester ultrasound) presenting to ED for nausea/vomiting.    Patient was admitted to antepartum service for hyperemesis gravidarum 3/14-3/17. Discharged with PO Diclegis, PO Benadryl, and Macrobid 100mg BID for After she left the hospital on Friday states that she went home and has been unable to tolerate anything since, and has not taken any medication. Vomiting every 45 minutes. Feeling some relief in the ED with Zofran, able to tolerate apple juice with the IV fluids. Additionally states that with last pregnancy, medication that dissolved under the tongue helped the most with nausea.    Denies any dysuria, urinary symptoms, back pain. Denies cramping or vaginal bleeding.     Name of GYN Physician: [facility] ([address] [place] [address])    ObHx: NSVDx1  GynHx: Denies fibroids, cysts, endometriosis, STI's, Abnormal pap smears   PMHx: GERD  SurgHx: denies  Meds: denies  Allergies: NKDA  Social History:  Denies smoking use, drug use, alcohol use.    Vital Signs Last 24 Hrs  T(C): 36.7 (19 Mar 2023 17:51), Max: 36.9 (19 Mar 2023 15:52)  T(F): 98 (19 Mar 2023 17:51), Max: 98.5 (19 Mar 2023 15:52)  HR: 88 (19 Mar 2023 17:51) (87 - 88)  BP: 122/89 (19 Mar 2023 17:51) (119/88 - 122/89)  BP(mean): --  RR: 18 (19 Mar 2023 17:51) (18 - 20)  SpO2: 99% (19 Mar 2023 17:51) (98% - 99%)    Parameters below as of 19 Mar 2023 15:52  Patient On (Oxygen Delivery Method): room air    Physical Exam:   General: sitting comfortably in bed, NAD   HEENT: neck supple, full ROM  CV: RR S1S2 no m/r/g  Lungs: CTA b/l, good air flow b/l   Back: No CVA tenderness bilaterally.  Abd: Soft, non-tender, non-distended. Bowel sounds present.      LABS:             14.8   18.98 )-----------( 292      ( 19 Mar 2023 18:32 )             44.3         136  |  97  |  16  ----------------------------<  98  4.1   |  21<L>  |  0.57    Ca    10.4      19 Mar 2023 18:32    TPro  8.7<H>  /  Alb  5.0  /  TBili  0.5  /  DBili  x   /  AST  55<H>  /  ALT  58<H>  /  AlkPhos  65      I&O's Detail    Urinalysis Basic - ( 19 Mar 2023 21:30 )    Color: Yellow / Appearance: Slightly Turbid / S.036 / pH: x  Gluc: x / Ketone: Large  / Bili: Negative / Urobili: 2 mg/dL   Blood: x / Protein: 30 mg/dL / Nitrite: Negative   Leuk Esterase: Large / RBC: 1 /hpf / WBC 46 /HPF   Sq Epi: x / Non Sq Epi: 7 /hpf / Bacteria: Moderate    RADIOLOGY & ADDITIONAL STUDIES:  < from: US Kidney and Bladder (23 @ 20:53) >  ACC: [id number] EXAM:  US KIDNEYS AND BLADDER   ORDERED BY: ROZ WELLS     PROCEDURE DATE:  2023          INTERPRETATION:  CLINICAL INFORMATION: Leukocytosis and urinary tract   infection in first trimester of pregnancy, evaluate for pyelonephritis. No    COMPARISON: None available.    TECHNIQUE: Sonography of the kidneys and bladder.    FINDINGS:  Right kidney: 10.0 cm. No renal mass, hydronephrosis or calculi.    Left kidney: 11.2 cm. No renal mass, hydronephrosis or calculi.    Urinary bladder: Not fully distended.    IMPRESSION:  No evidence of hydronephrosis. No ultrasound findings suggestive of   pyelonephritis    --- End of Report ---     RENE LAM MD; Resident Radiology  This document has been electronically signed.   MOLLY MOLINA MD; Attending Radiologist  This document has been electronically signed. Mar 19 2023  9:05PM    < end of copied text >

## 2023-03-19 NOTE — ED PROVIDER NOTE - PHYSICAL EXAMINATION
General appearance: uncomfortable appearing, conversant, afebrile    Eyes: anicteric sclerae, RAZA, EOMI   HENT: Atraumatic; oropharynx clear, MMM and no ulcerations, no pharyngeal erythema or exudate   Neck: Trachea midline; Full range of motion, supple   Pulm: CTA bl, normal respiratory effort and no intercostal retractions, normal work of breathing   CV: RRR, No murmurs, rubs, or gallops. 2+ peripheral pulses.   Abdomen: Soft, non-tender, non-distended; no guarding or rebound   Extremities: No peripheral edema or extremity lymphadenopathy. 5/5 strength in all four extremities.   Skin: Dry, normal temperature, turgor and texture; no rash, ulcers or subcutaneous nodules   Psych: Appropriate affect, cooperative; alert and oriented to person, place and time

## 2023-03-19 NOTE — ED ADULT NURSE NOTE - OBJECTIVE STATEMENT
pt is 5 weeks pregnant and has been admitted for n/v with release last tues.  se continues to have n/v with no relief from meds perscribed.  she has no vaginal discharge and no cramping

## 2023-03-19 NOTE — CONSULT NOTE ADULT - ASSESSMENT
26 y/o  7w2d (TRISTIAN 23 by LMP/first trimester ultrasound) re-presenting with hyperemesis gravidarum.    #Hyperemesis Gravidarum  - Recommend trial of Phenergen 25mg suppository, then PO challenge if patient amenable  - C/w Diclegis, IV fluids    #UTI  - Patient did not take Macrobid outpatient due to nausea/vomiting  - Patient without urinary symptoms, fevers, CVA tenderness, or suprapubic pain  - Renal sono per ED to r/o pyelonephritis negative  - Plan per ED Ceftriaxone 1000mg IV for treatment    Yajaira Sheu  PGY-2   28 y/o  7w2d (TRISTIAN 23 by LMP/first trimester ultrasound) re-presenting with hyperemesis gravidarum.    #Hyperemesis Gravidarum  - Recommend trial of Phenergen 25mg suppository, then PO challenge if patient amenable  - Electrolytes wnl, UA w/ ketones consistent w/ prior UA  - C/w Diclegis, IV fluids    #UTI  - Patient did not take Macrobid outpatient due to nausea/vomiting  - Patient without urinary symptoms, fevers, CVA tenderness, or suprapubic pain  - Renal sono per ED to r/o pyelonephritis negative  - Plan per ED Ceftriaxone 1000mg IV for treatment    Yajaira Buchanan  PGY-2    ADDENDUM 3/20@8103  - Plan for CDU as patient tolerating apple juice however not comfortable going home  - Continue to monitor  - Recommend IV fluids, IV Zofran as needed, PO challenge    d/w attending Dr Gigi Buchanan  PGY-2   26 y/o  7w2d (TRISTIAN 23 by LMP/first trimester ultrasound) re-presenting with hyperemesis gravidarum.    #Hyperemesis Gravidarum  - Recommend trial of Phenergen 25mg suppository, then PO challenge if patient amenable  - Electrolytes wnl, UA w/ ketones consistent w/ prior UA  - C/w Diclegis, IV fluids    #UTI  - Patient did not take Macrobid outpatient due to nausea/vomiting  - Patient without urinary symptoms, fevers, CVA tenderness, or suprapubic pain  - Renal sono per ED to r/o pyelonephritis negative  - Plan per ED Ceftriaxone 1000mg IV for treatment    Yajaira Buchanan  PGY-2    ADDENDUM 3/20@3503  - Plan for CDU as patient tolerating apple juice however not comfortable going home  - Continue to monitor  - Recommend IV fluids, IV Zofran as needed, PO challenge  - If patient tolerating PO, can d/c with Macrobid as previously prescribed and Diclegis PO, Benadryl, Zofran ODT    d/w attending Dr Gigi Buchanan  PGY-2   26 y/o  7w2d (TRISTIAN 23 by LMP/first trimester ultrasound) re-presenting with hyperemesis gravidarum.    #Hyperemesis Gravidarum  - Recommend trial of Phenergen 25mg suppository, then PO challenge if patient amenable  - Electrolytes wnl, UA w/ ketones consistent w/ prior UA  - C/w Diclegis, IV fluids    #UTI  - Patient did not take Macrobid outpatient due to nausea/vomiting  - Patient without urinary symptoms, fevers, CVA tenderness, or suprapubic pain  - Renal sono per ED to r/o pyelonephritis negative  - Plan per ED Ceftriaxone 1000mg IV for treatment    Yajaira Buchaann  PGY-2    ADDENDUM 3/20@6497  - Plan for CDU as patient tolerating apple juice however not comfortable going home  - Continue to monitor  - Recommend IV fluids, IV Zofran as needed, PO challenge  - If patient tolerating PO, can d/c with Macrobid as previously prescribed and Diclegis PO TID, Benadryl, Zofran ODT 4mg q8h, and Promethazine 12.5mg rectally q6h if pt desires    d/w attending Dr Gigi Buchanan  PGY-2

## 2023-03-20 VITALS
HEART RATE: 83 BPM | TEMPERATURE: 98 F | OXYGEN SATURATION: 100 % | SYSTOLIC BLOOD PRESSURE: 110 MMHG | RESPIRATION RATE: 15 BRPM | DIASTOLIC BLOOD PRESSURE: 73 MMHG

## 2023-03-20 LAB
ALBUMIN SERPL ELPH-MCNC: 3.8 G/DL — SIGNIFICANT CHANGE UP (ref 3.3–5)
ALP SERPL-CCNC: 46 U/L — SIGNIFICANT CHANGE UP (ref 40–120)
ALT FLD-CCNC: 44 U/L — SIGNIFICANT CHANGE UP (ref 10–45)
ANION GAP SERPL CALC-SCNC: 12 MMOL/L — SIGNIFICANT CHANGE UP (ref 5–17)
AST SERPL-CCNC: 36 U/L — SIGNIFICANT CHANGE UP (ref 10–40)
BASOPHILS # BLD AUTO: 0.03 K/UL — SIGNIFICANT CHANGE UP (ref 0–0.2)
BASOPHILS NFR BLD AUTO: 0.3 % — SIGNIFICANT CHANGE UP (ref 0–2)
BILIRUB SERPL-MCNC: 0.4 MG/DL — SIGNIFICANT CHANGE UP (ref 0.2–1.2)
BUN SERPL-MCNC: 13 MG/DL — SIGNIFICANT CHANGE UP (ref 7–23)
CALCIUM SERPL-MCNC: 9.1 MG/DL — SIGNIFICANT CHANGE UP (ref 8.4–10.5)
CHLORIDE SERPL-SCNC: 100 MMOL/L — SIGNIFICANT CHANGE UP (ref 96–108)
CO2 SERPL-SCNC: 21 MMOL/L — LOW (ref 22–31)
CREAT SERPL-MCNC: 0.56 MG/DL — SIGNIFICANT CHANGE UP (ref 0.5–1.3)
EGFR: 127 ML/MIN/1.73M2 — SIGNIFICANT CHANGE UP
EOSINOPHIL # BLD AUTO: 0.16 K/UL — SIGNIFICANT CHANGE UP (ref 0–0.5)
EOSINOPHIL NFR BLD AUTO: 1.4 % — SIGNIFICANT CHANGE UP (ref 0–6)
GLUCOSE SERPL-MCNC: 106 MG/DL — HIGH (ref 70–99)
HCT VFR BLD CALC: 34.9 % — SIGNIFICANT CHANGE UP (ref 34.5–45)
HGB BLD-MCNC: 11.5 G/DL — SIGNIFICANT CHANGE UP (ref 11.5–15.5)
IMM GRANULOCYTES NFR BLD AUTO: 0.8 % — SIGNIFICANT CHANGE UP (ref 0–0.9)
LYMPHOCYTES # BLD AUTO: 19.6 % — SIGNIFICANT CHANGE UP (ref 13–44)
LYMPHOCYTES # BLD AUTO: 2.28 K/UL — SIGNIFICANT CHANGE UP (ref 1–3.3)
MAGNESIUM SERPL-MCNC: 2 MG/DL — SIGNIFICANT CHANGE UP (ref 1.6–2.6)
MCHC RBC-ENTMCNC: 28.7 PG — SIGNIFICANT CHANGE UP (ref 27–34)
MCHC RBC-ENTMCNC: 33 GM/DL — SIGNIFICANT CHANGE UP (ref 32–36)
MCV RBC AUTO: 87 FL — SIGNIFICANT CHANGE UP (ref 80–100)
MONOCYTES # BLD AUTO: 0.95 K/UL — HIGH (ref 0–0.9)
MONOCYTES NFR BLD AUTO: 8.2 % — SIGNIFICANT CHANGE UP (ref 2–14)
NEUTROPHILS # BLD AUTO: 8.11 K/UL — HIGH (ref 1.8–7.4)
NEUTROPHILS NFR BLD AUTO: 69.7 % — SIGNIFICANT CHANGE UP (ref 43–77)
NRBC # BLD: 0 /100 WBCS — SIGNIFICANT CHANGE UP (ref 0–0)
PLATELET # BLD AUTO: 211 K/UL — SIGNIFICANT CHANGE UP (ref 150–400)
POTASSIUM SERPL-MCNC: 3.4 MMOL/L — LOW (ref 3.5–5.3)
POTASSIUM SERPL-SCNC: 3.4 MMOL/L — LOW (ref 3.5–5.3)
PROT SERPL-MCNC: 6.5 G/DL — SIGNIFICANT CHANGE UP (ref 6–8.3)
RBC # BLD: 4.01 M/UL — SIGNIFICANT CHANGE UP (ref 3.8–5.2)
RBC # FLD: 15.3 % — HIGH (ref 10.3–14.5)
SODIUM SERPL-SCNC: 133 MMOL/L — LOW (ref 135–145)
WBC # BLD: 11.62 K/UL — HIGH (ref 3.8–10.5)
WBC # FLD AUTO: 11.62 K/UL — HIGH (ref 3.8–10.5)

## 2023-03-20 PROCEDURE — 87040 BLOOD CULTURE FOR BACTERIA: CPT

## 2023-03-20 PROCEDURE — 84132 ASSAY OF SERUM POTASSIUM: CPT

## 2023-03-20 PROCEDURE — 84295 ASSAY OF SERUM SODIUM: CPT

## 2023-03-20 PROCEDURE — 36415 COLL VENOUS BLD VENIPUNCTURE: CPT

## 2023-03-20 PROCEDURE — 96375 TX/PRO/DX INJ NEW DRUG ADDON: CPT

## 2023-03-20 PROCEDURE — 83605 ASSAY OF LACTIC ACID: CPT

## 2023-03-20 PROCEDURE — 99223 1ST HOSP IP/OBS HIGH 75: CPT

## 2023-03-20 PROCEDURE — 85025 COMPLETE CBC W/AUTO DIFF WBC: CPT

## 2023-03-20 PROCEDURE — 76770 US EXAM ABDO BACK WALL COMP: CPT

## 2023-03-20 PROCEDURE — 87086 URINE CULTURE/COLONY COUNT: CPT

## 2023-03-20 PROCEDURE — 85014 HEMATOCRIT: CPT

## 2023-03-20 PROCEDURE — 82330 ASSAY OF CALCIUM: CPT

## 2023-03-20 PROCEDURE — 82947 ASSAY GLUCOSE BLOOD QUANT: CPT

## 2023-03-20 PROCEDURE — 99285 EMERGENCY DEPT VISIT HI MDM: CPT | Mod: 25

## 2023-03-20 PROCEDURE — 96361 HYDRATE IV INFUSION ADD-ON: CPT

## 2023-03-20 PROCEDURE — 82803 BLOOD GASES ANY COMBINATION: CPT

## 2023-03-20 PROCEDURE — 87637 SARSCOV2&INF A&B&RSV AMP PRB: CPT

## 2023-03-20 PROCEDURE — 81001 URINALYSIS AUTO W/SCOPE: CPT

## 2023-03-20 PROCEDURE — 84702 CHORIONIC GONADOTROPIN TEST: CPT

## 2023-03-20 PROCEDURE — 85018 HEMOGLOBIN: CPT

## 2023-03-20 PROCEDURE — 80053 COMPREHEN METABOLIC PANEL: CPT

## 2023-03-20 PROCEDURE — 82435 ASSAY OF BLOOD CHLORIDE: CPT

## 2023-03-20 PROCEDURE — 96374 THER/PROPH/DIAG INJ IV PUSH: CPT

## 2023-03-20 PROCEDURE — G0378: CPT

## 2023-03-20 PROCEDURE — 83735 ASSAY OF MAGNESIUM: CPT

## 2023-03-20 RX ORDER — CEFTRIAXONE 500 MG/1
1000 INJECTION, POWDER, FOR SOLUTION INTRAMUSCULAR; INTRAVENOUS EVERY 24 HOURS
Refills: 0 | Status: DISCONTINUED | OUTPATIENT
Start: 2023-03-20 | End: 2023-03-23

## 2023-03-20 RX ORDER — ONDANSETRON 8 MG/1
1 TABLET, FILM COATED ORAL
Qty: 15 | Refills: 0
Start: 2023-03-20 | End: 2023-03-24

## 2023-03-20 RX ORDER — FAMOTIDINE 10 MG/ML
20 INJECTION INTRAVENOUS ONCE
Refills: 0 | Status: COMPLETED | OUTPATIENT
Start: 2023-03-20 | End: 2023-03-20

## 2023-03-20 RX ORDER — SODIUM CHLORIDE 9 MG/ML
1000 INJECTION, SOLUTION INTRAVENOUS
Refills: 0 | Status: DISCONTINUED | OUTPATIENT
Start: 2023-03-20 | End: 2023-03-23

## 2023-03-20 RX ORDER — ACETAMINOPHEN 500 MG
975 TABLET ORAL ONCE
Refills: 0 | Status: COMPLETED | OUTPATIENT
Start: 2023-03-20 | End: 2023-03-20

## 2023-03-20 RX ADMIN — Medication 975 MILLIGRAM(S): at 00:41

## 2023-03-20 RX ADMIN — SODIUM CHLORIDE 125 MILLILITER(S): 9 INJECTION, SOLUTION INTRAVENOUS at 04:48

## 2023-03-20 RX ADMIN — SODIUM CHLORIDE 1000 MILLILITER(S): 9 INJECTION, SOLUTION INTRAVENOUS at 00:00

## 2023-03-20 RX ADMIN — Medication 25 MILLIGRAM(S): at 00:42

## 2023-03-20 RX ADMIN — Medication 975 MILLIGRAM(S): at 02:00

## 2023-03-20 RX ADMIN — FAMOTIDINE 20 MILLIGRAM(S): 10 INJECTION INTRAVENOUS at 00:41

## 2023-03-20 NOTE — ED CDU PROVIDER INITIAL DAY NOTE - PHYSICAL EXAMINATION
General appearance: uncomfortable appearing, conversant, afebrile    Eyes: anicteric sclerae, RAZA, EOMI   HENT: Atraumatic; oropharynx clear, MMM and no ulcerations, no pharyngeal erythema or exudate   Neck: Trachea midline; Full range of motion, supple   Pulm: CTA bl, normal respiratory effort and no intercostal retractions, normal work of breathing   CV: RRR, No murmurs, rubs, or gallops. 2+ peripheral pulses.   Abdomen: Soft, non-tender, no guarding or rebound   Extremities: No peripheral edema or extremity lymphadenopathy. 5/5 strength in all four extremities.   Skin: Dry, normal temperature, turgor and texture; no rash, ulcers or subcutaneous nodules   Psych: Appropriate affect, cooperative; alert and oriented to person, place and time

## 2023-03-20 NOTE — ED CDU PROVIDER INITIAL DAY NOTE - DETAILS
27-year-old female with history of hyperemesis gravidarum presenting with vomiting. UTI  Plan: frequent reeval, vitals q 4hrs, iv abx, pain control, PO challenge.

## 2023-03-20 NOTE — ED ADULT NURSE REASSESSMENT NOTE - NS ED NURSE REASSESS COMMENT FT1
13.30 Pt is evaluated by CDU MD Shahida Anglin . pt is feeling better.  Pt is discharged . Ml out  ALEXX Yang   explained the follow up care & gave the discharge summary  . Pt has stable vitals steady gait A&OX 4 at the time of discharge
Received report from RN Ty. Pt is A&Ox3, breathing spontaneously ,unlabored and speaking in full sentences on RA. Pt safety and comfort measures provided.
07.00 Am Received the Pt from  GRETCHEN Dodson . Pt is Observed for Hyperemesis  for PO Challenges . Received the Pt A&OX 4 obeys commands Carrie N/V/D fever chills cp SOB   Comfort care & safety measures continued  IV site looks clean & dry no signs of infiltration noted pt denies  pain IV site .  Pt is advised to call for help  call bell with in the reach pt verbalized the understanding .  pending CDU  MD self . GCS 15/15 A&OX 4 PERRLA  size 3 Strong upper & lower extremities steady gait   No facial droop  No Hand Leg drop denies numbness tingling Continue to monitor  09.00 Pt was Po Challenged with Jello & broth

## 2023-03-20 NOTE — ED CDU PROVIDER INITIAL DAY NOTE - ATTENDING APP SHARED VISIT CONTRIBUTION OF CARE
ATTENDING, MD Kimberly: I have personally performed a face to face diagnostic evaluation on this patient. I performed a substantive portion of the visit including all aspects of the medical decision making. I have reviewed the ACP note and agree with the history, exam, and plan of care, except as noted here. Progress notes and further evaluation to be reviewed by observation and discharging attending.

## 2023-03-20 NOTE — ED CDU PROVIDER INITIAL DAY NOTE - CLINICAL SUMMARY MEDICAL DECISION MAKING FREE TEXT BOX
Attending/MD Kimberly. 28 yo F, , extensive history of hyperemesis gravirata during the first pregnancy, lasted until 7 mo, here for no PO, nauseous, and vomits, pt recently discharged from GYN unit, documented IUP at clinic and no clinical sings of ectopic such as vaginal bleed or abd pain, given recent uti, still positive UA, pt needs iv abx, due to unreliable PO abx.

## 2023-03-20 NOTE — ED CDU PROVIDER DISPOSITION NOTE - NSFOLLOWUPINSTRUCTIONS_ED_ALL_ED_FT
1. Follow up with your PCP within 2-3 days. Follow up with OBGYN within 3-5 days. Call Dr. Ventura's office to schedule an appointment.   2. Rest. Stay hydrated.   3. You can take Diclegis three times daily as needed for nausea/vomiting. You can take benadryl 25 mg every 6 hours as needed for nausea/vomiting. This will make you drowsy. Do NOT drive while taking. You may take Zofran ODT 4 mg every 8 hours as needed for nausea/vomiting. You may take Promethazine 12.5 mg rectally every 6 hours as needed for nausea.   4. Finish taking entire course of Macrobid as directed for urinary tract infection.   5. Return to the emergency department if you develop worsening pain, vomiting, fevers, difficulty urinating, vaginal bleeding or any other concerning symptoms.

## 2023-03-20 NOTE — PROGRESS NOTE ADULT - SUBJECTIVE AND OBJECTIVE BOX
HD#2    Patient seen and examined at bedside, no acute overnight events. No acute complaints. No vomiting overnight. Nausea improved with medication. Tolerating juice overnight. Denies dysuria, frequency, urgency.  Denies CP, SOB,  fevers, and chills.    Vital Signs Last 24 Hours  T(C): 37.1 (03-20-23 @ 03:45), Max: 37.1 (03-20-23 @ 03:45)  HR: 60 (03-20-23 @ 03:45) (60 - 88)  BP: 105/58 (03-20-23 @ 03:45) (105/58 - 122/89)  RR: 16 (03-20-23 @ 03:45) (16 - 20)  SpO2: 100% (03-20-23 @ 03:45) (98% - 100%)    I&O's Summary      Physical Exam:  General: NAD  CV: NR, RR, S1, S2, no M/R/G  Lungs: CTA-B  Abdomen: Soft, non-tender, non-distended  Ext: No pain or swelling    Labs:                        14.8   18.98 )-----------( 292      ( 19 Mar 2023 18:32 )             44.3   baso 0.0    eos 0.0    imm gran x      lymph 14.8   mono 6.9    poly 78.3       MEDICATIONS  (STANDING):  cefTRIAXone   IVPB 1000 milliGRAM(s) IV Intermittent every 24 hours  dextrose 5% + sodium chloride 0.9%. 1000 milliLiter(s) (125 mL/Hr) IV Continuous <Continuous>    MEDICATIONS  (PRN):

## 2023-03-20 NOTE — ED CDU PROVIDER INITIAL DAY NOTE - OBJECTIVE STATEMENT
27-year-old female with history of hyperemesis gravidarum presenting with vomiting.  Patient is 8 weeks pregnant confirmed on ultrasound.  Patient was admitted a couple days ago to OB/GYN for hyperemesis gravidarum, felt better and was tolerating p.o.  However when she went home, she started vomiting again. Patient also found to have UTI, taking Macrobid.  Has been taking Diclegis without improvement.  No fever, chest pain, shortness of breath, abdominal pain. Pt also had hyperemesis gravidarum during first pregnancy.  In ED, patient had laboratory significant for Leukocytosis, elevated Lactate and abnormal UA. Urine culture form 03/15 showed E. coli. Pt was started on IV Ceftriaxone and sent to CDU for frequent reeval, vitals q 4hrs, iv abx, pain control, PO challenge.

## 2023-03-20 NOTE — ED CDU PROVIDER DISPOSITION NOTE - PATIENT PORTAL LINK FT
You can access the FollowMyHealth Patient Portal offered by [facility] by registering at the following website: [email or web address]. By joining [facility]’s FollowMyHealth portal, you will also be able to view your health information using other applications (apps) compatible with our system.

## 2023-03-20 NOTE — PROGRESS NOTE ADULT - ASSESSMENT
26 y/o  7w3d (TRISTIAN 23 by LMP/first trimester ultrasound) re-presenting with hyperemesis gravidarum. No vomiting since presenting to the ED. Nausea improved with anti emetics. Received zofran x1 during entire stay in ED. Asymptomatic UTI, now s/p Ceftriaxone x1.     Neuro: no pain   CV: hemodynamically stable  Pulm: saturating well on room air  GI: regular diet as tolerated   : Voiding spontaneously  - Asymptomatic UTI: s/p Ceftriaxone 1g IV x1. Can continue outpatient Macrobid if tolerating.   - Can dc with Diclegis PO TID, Benadryl, Zofran ODT 4mg q8h, and Promethazine 12.5mg rectally q6h if pt desires  Heme: c/w HSQ and SCDs for DVT ppx  Dispo: discharge home     Yahaira Godfrey, PGY2 28 y/o  7w3d (TRISTIAN 23 by LMP/first trimester ultrasound) re-presenting with hyperemesis gravidarum. No vomiting since presenting to the ED. Nausea improved with anti emetics. Received zofran x1, promethazine x1, benadryl x1 during stay in ED. Asymptomatic UTI, now s/p Ceftriaxone x1.     Neuro: no pain   CV: hemodynamically stable  Pulm: saturating well on room air  GI: regular diet as tolerated   : Voiding spontaneously  - Asymptomatic UTI: s/p Ceftriaxone 1g IV x1. Can continue outpatient Macrobid if tolerating.   - Can dc with Diclegis PO TID, Benadryl, Zofran ODT 4mg q8h, and Promethazine 12.5mg rectally q6h if pt desires  Heme: c/w HSQ and SCDs for DVT ppx  Dispo: discharge home     Yahaira Godfrey, PGY2 26 y/o  7w3d (TRISTIAN 23 by LMP/first trimester ultrasound) re-presenting with hyperemesis gravidarum. No vomiting since presenting to the ED. Nausea improved with anti emetics. Received zofran x1, promethazine x1, benadryl x1 during stay in ED. Asymptomatic UTI, now s/p Ceftriaxone x1.     Neuro: no pain   CV: hemodynamically stable  Pulm: saturating well on room air  GI: regular diet as tolerated   : Voiding spontaneously  - Asymptomatic UTI: s/p Ceftriaxone 1g IV x1. Does not need to continue Macrobid outpatient.   - Can dc with Diclegis PO TID, Benadryl, Zofran ODT 4mg q8h, and Promethazine 12.5mg rectally q6h if pt desires  - Pt to call office to be seen within the next week by Dr. Ventura  Heme: c/w HSQ and SCDs for DVT ppx  Dispo: discharge home     Yahaira Godfrey, PGY2  d/w Dr. Yu

## 2023-03-20 NOTE — ED CDU PROVIDER INITIAL DAY NOTE - PROGRESS NOTE DETAILS
Pt was evaluated by OB/GYN, recs appreciated, will continue IV abx and advance diet. Evaluated at bedside. No active vomiting. Abdomen is soft and non-tender. No acute distress or complaints at this time. Will PO challenge and continue to monitor. Celia Aguilar PA-C Patient able to tolerate PO breakfast. No vomiting. Spoke to OBGYN stable for d/c home. WIll send her meds. CDU attending evaluated at bedside. Agrees with plan for d/c. All recommendations explained to patient. Verbalized understanding. Return precautions discussed. Celia Aguilar PA-C

## 2023-03-20 NOTE — ED CDU PROVIDER DISPOSITION NOTE - CLINICAL COURSE
27-year-old female with history of hyperemesis gravidarum presenting with vomiting.  Patient is 8 weeks pregnant confirmed on ultrasound.  Patient was admitted a couple days ago to OB/GYN for hyperemesis gravidarum, felt better and was tolerating p.o.  However when she went home, she started vomiting again. Patient also found to have UTI, taking Macrobid.  Has been taking Diclegis without improvement.  No fever, chest pain, shortness of breath, abdominal pain. Pt also had hyperemesis gravidarum during first pregnancy.  In ED, patient had laboratory significant for Leukocytosis, elevated Lactate and abnormal UA. Urine culture form 03/15 showed E. coli. Pt was started on IV Ceftriaxone and sent to CDU for frequent reeval, vitals q 4hrs, iv abx, pain control, PO challenge. 27-year-old female with history of hyperemesis gravidarum presenting with vomiting.  Patient is 8 weeks pregnant confirmed on ultrasound.  Patient was admitted a couple days ago to OB/GYN for hyperemesis gravidarum, felt better and was tolerating p.o.  However when she went home, she started vomiting again. Patient also found to have UTI, taking Macrobid.  Has been taking Diclegis without improvement.  No fever, chest pain, shortness of breath, abdominal pain. Pt also had hyperemesis gravidarum during first pregnancy.  In ED, patient had laboratory significant for Leukocytosis, elevated Lactate and abnormal UA. Urine culture form 03/15 showed E. coli. Pt was started on IV Ceftriaxone and sent to CDU for frequent reeval, vitals q 4hrs, iv abx, pain control, PO challenge.    While in CDU patient had IV ABX and symptomatic care. Patient able to tolerate PO this AM. Evaluated by OBGYN  and cleared for d/c.

## 2023-03-20 NOTE — ED CDU PROVIDER DISPOSITION NOTE - ATTENDING APP SHARED VISIT CONTRIBUTION OF CARE
Dr. Pinedo: I performed a face to face bedside interview with patient regarding history of present illness, review of symptoms and past medical history. I completed an independent physical exam.  I have discussed patient's plan of care with PA.   I agree with note as stated above, having amended the EMR as needed to reflect my findings.   This includes HISTORY OF PRESENT ILLNESS, HIV, PAST MEDICAL/SURGICAL/FAMILY/SOCIAL HISTORY, ALLERGIES AND HOME MEDICATIONS, REVIEW OF SYSTEMS, PHYSICAL EXAM, and any PROGRESS NOTES during the time I functioned as the attending physician for this patient.  Dr. Pinedo: 27-year-old female  8 weeks pregnant history of hyperemesis on Diclegis at home, recently admitted with E. coli UTI pansensitive and discharged on Macrobid, presented to the ED yesterday with nausea vomiting and inability to tolerate po.  Patient placed in the CDU to receive IV fluids, antiemetics, antibiotics (ceftriaxone) and p.o. challenge.  Being seen by OB/GYN.  Denies abdominal pain, vaginal bleeding, vaginal discharge.  On exam patient is very well-appearing, no acute distress, mucous membranes moist, abdomen soft nontender nondistended.  Patient states she is feeling better and will attempt to eat breakfast this morning.  If patient is tolerating p.o. patient will be able to get discharged on abx for UTI. Dr. Pinedo: I performed a face to face bedside interview with patient regarding history of present illness, review of symptoms and past medical history. I completed an independent physical exam.  I have discussed patient's plan of care with PA.   I agree with note as stated above, having amended the EMR as needed to reflect my findings.   This includes HISTORY OF PRESENT ILLNESS, HIV, PAST MEDICAL/SURGICAL/FAMILY/SOCIAL HISTORY, ALLERGIES AND HOME MEDICATIONS, REVIEW OF SYSTEMS, PHYSICAL EXAM, and any PROGRESS NOTES during the time I functioned as the attending physician for this patient.  Dr. Pinedo: 27-year-old female  8 weeks pregnant history of hyperemesis on Diclegis at home, recently admitted with E. coli UTI pansensitive and discharged on Macrobid, presented to the ED yesterday with nausea vomiting and inability to tolerate po.  Patient placed in the CDU to receive IV fluids, antiemetics, antibiotics (ceftriaxone) and p.o. challenge.  Being seen by OB/GYN.  Denies abdominal pain, vaginal bleeding, vaginal discharge.  On exam patient is very well-appearing, no acute distress, mucous membranes moist, abdomen soft nontender nondistended.  Patient states she is feeling better and will attempt to eat breakfast this morning.  If patient is tolerating p.o. patient will be able to get discharged on abx for UTI.  Pt able to tolerate po, will dc home and to finish macrobid course.

## 2023-03-21 LAB
CULTURE RESULTS: SIGNIFICANT CHANGE UP
SPECIMEN SOURCE: SIGNIFICANT CHANGE UP

## 2023-03-22 ENCOUNTER — APPOINTMENT (OUTPATIENT)
Dept: OBGYN | Facility: CLINIC | Age: 28
End: 2023-03-22
Payer: COMMERCIAL

## 2023-03-22 VITALS
DIASTOLIC BLOOD PRESSURE: 77 MMHG | HEIGHT: 64 IN | SYSTOLIC BLOOD PRESSURE: 112 MMHG | BODY MASS INDEX: 26.63 KG/M2 | WEIGHT: 156 LBS

## 2023-03-22 DIAGNOSIS — Z87.898 PERSONAL HISTORY OF OTHER SPECIFIED CONDITIONS: ICD-10-CM

## 2023-03-22 PROCEDURE — 99214 OFFICE O/P EST MOD 30 MIN: CPT

## 2023-03-22 RX ORDER — DOXYLAMINE SUCCINATE AND PYRIDOXINE HYDROCHLORIDE 10; 10 MG/1; MG/1
10-10 TABLET, DELAYED RELEASE ORAL
Refills: 0 | Status: ACTIVE | COMMUNITY
Start: 2023-03-22

## 2023-03-22 RX ORDER — ONDANSETRON 4 MG/1
4 TABLET, ORALLY DISINTEGRATING ORAL EVERY 6 HOURS
Qty: 30 | Refills: 3 | Status: ACTIVE | COMMUNITY
Start: 2023-03-22 | End: 1900-01-01

## 2023-03-22 RX ORDER — ONDANSETRON 4 MG/1
4 TABLET, ORALLY DISINTEGRATING ORAL
Refills: 0 | Status: ACTIVE | COMMUNITY
Start: 2023-03-22

## 2023-03-22 RX ORDER — PROMETHAZINE HYDROCHLORIDE 12.5 MG/1
12.5 SUPPOSITORY RECTAL
Refills: 0 | Status: ACTIVE | COMMUNITY
Start: 2023-03-22

## 2023-03-25 LAB
CULTURE RESULTS: SIGNIFICANT CHANGE UP
CULTURE RESULTS: SIGNIFICANT CHANGE UP
SPECIMEN SOURCE: SIGNIFICANT CHANGE UP
SPECIMEN SOURCE: SIGNIFICANT CHANGE UP

## 2023-03-27 LAB — BACTERIA UR CULT: NORMAL

## 2023-03-30 ENCOUNTER — NON-APPOINTMENT (OUTPATIENT)
Age: 28
End: 2023-03-30

## 2023-04-04 ENCOUNTER — APPOINTMENT (OUTPATIENT)
Dept: OBGYN | Facility: CLINIC | Age: 28
End: 2023-04-04
Payer: COMMERCIAL

## 2023-04-04 VITALS
SYSTOLIC BLOOD PRESSURE: 119 MMHG | DIASTOLIC BLOOD PRESSURE: 75 MMHG | BODY MASS INDEX: 26.63 KG/M2 | HEIGHT: 64 IN | WEIGHT: 156 LBS

## 2023-04-04 PROCEDURE — 99213 OFFICE O/P EST LOW 20 MIN: CPT

## 2023-04-05 ENCOUNTER — APPOINTMENT (OUTPATIENT)
Dept: OBGYN | Facility: CLINIC | Age: 28
End: 2023-04-05
Payer: COMMERCIAL

## 2023-04-05 LAB
ABO + RH PNL BLD: NORMAL
ALBUMIN SERPL ELPH-MCNC: 4.5 G/DL
ALP BLD-CCNC: 63 U/L
ALT SERPL-CCNC: 153 U/L
ANION GAP SERPL CALC-SCNC: 18 MMOL/L
AST SERPL-CCNC: 109 U/L
BASOPHILS # BLD AUTO: 0.05 K/UL
BASOPHILS NFR BLD AUTO: 0.4 %
BILIRUB SERPL-MCNC: 0.5 MG/DL
BLD GP AB SCN SERPL QL: NORMAL
BUN SERPL-MCNC: 11 MG/DL
C TRACH RRNA SPEC QL NAA+PROBE: NOT DETECTED
CALCIUM SERPL-MCNC: 10.5 MG/DL
CHLORIDE SERPL-SCNC: 99 MMOL/L
CO2 SERPL-SCNC: 17 MMOL/L
CREAT SERPL-MCNC: 0.53 MG/DL
EGFR: 128 ML/MIN/1.73M2
EOSINOPHIL # BLD AUTO: 0.11 K/UL
EOSINOPHIL NFR BLD AUTO: 0.9 %
GLUCOSE SERPL-MCNC: 77 MG/DL
HBV SURFACE AG SER QL: NONREACTIVE
HCT VFR BLD CALC: 42.7 %
HCV AB SER QL: NONREACTIVE
HCV S/CO RATIO: 0.16 S/CO
HGB A MFR BLD: 97.2 %
HGB A2 MFR BLD: 2.8 %
HGB BLD-MCNC: 13.5 G/DL
HGB FRACT BLD-IMP: NORMAL
HIV1+2 AB SPEC QL IA.RAPID: NONREACTIVE
IMM GRANULOCYTES NFR BLD AUTO: 0.4 %
LYMPHOCYTES # BLD AUTO: 2.05 K/UL
LYMPHOCYTES NFR BLD AUTO: 16.1 %
MAN DIFF?: NORMAL
MCHC RBC-ENTMCNC: 29.3 PG
MCHC RBC-ENTMCNC: 31.6 GM/DL
MCV RBC AUTO: 92.6 FL
MONOCYTES # BLD AUTO: 0.79 K/UL
MONOCYTES NFR BLD AUTO: 6.2 %
N GONORRHOEA RRNA SPEC QL NAA+PROBE: NOT DETECTED
NEUTROPHILS # BLD AUTO: 9.7 K/UL
NEUTROPHILS NFR BLD AUTO: 76 %
PLATELET # BLD AUTO: 311 K/UL
POTASSIUM SERPL-SCNC: 4.4 MMOL/L
PROT SERPL-MCNC: 7.6 G/DL
RBC # BLD: 4.61 M/UL
RBC # FLD: 16.1 %
SODIUM SERPL-SCNC: 134 MMOL/L
SOURCE AMPLIFICATION: NORMAL
T PALLIDUM AB SER QL IA: NEGATIVE
WBC # FLD AUTO: 12.75 K/UL

## 2023-04-05 PROCEDURE — 99213 OFFICE O/P EST LOW 20 MIN: CPT

## 2023-04-06 LAB
LEAD BLD-MCNC: <1 UG/DL
MEV IGG FLD QL IA: 264 AU/ML
MEV IGG+IGM SER-IMP: POSITIVE
RUBV IGG FLD-ACNC: 8.1 INDEX
RUBV IGG SER-IMP: POSITIVE
VZV AB TITR SER: POSITIVE
VZV IGG SER IF-ACNC: 2756 INDEX

## 2023-04-10 ENCOUNTER — NON-APPOINTMENT (OUTPATIENT)
Age: 28
End: 2023-04-10

## 2023-04-12 ENCOUNTER — NON-APPOINTMENT (OUTPATIENT)
Age: 28
End: 2023-04-12

## 2023-04-13 ENCOUNTER — NON-APPOINTMENT (OUTPATIENT)
Age: 28
End: 2023-04-13

## 2023-04-14 ENCOUNTER — APPOINTMENT (OUTPATIENT)
Dept: OBGYN | Facility: CLINIC | Age: 28
End: 2023-04-14

## 2023-04-14 ENCOUNTER — NON-APPOINTMENT (OUTPATIENT)
Age: 28
End: 2023-04-14

## 2023-04-18 ENCOUNTER — NON-APPOINTMENT (OUTPATIENT)
Age: 28
End: 2023-04-18

## 2023-04-19 ENCOUNTER — APPOINTMENT (OUTPATIENT)
Dept: OBGYN | Facility: CLINIC | Age: 28
End: 2023-04-19
Payer: COMMERCIAL

## 2023-04-19 VITALS
HEIGHT: 64 IN | SYSTOLIC BLOOD PRESSURE: 127 MMHG | BODY MASS INDEX: 27.83 KG/M2 | WEIGHT: 163 LBS | DIASTOLIC BLOOD PRESSURE: 77 MMHG

## 2023-04-19 PROCEDURE — 99213 OFFICE O/P EST LOW 20 MIN: CPT

## 2023-04-24 ENCOUNTER — APPOINTMENT (OUTPATIENT)
Dept: ANTEPARTUM | Facility: CLINIC | Age: 28
End: 2023-04-24
Payer: COMMERCIAL

## 2023-04-24 ENCOUNTER — ASOB RESULT (OUTPATIENT)
Age: 28
End: 2023-04-24

## 2023-04-24 PROCEDURE — 76813 OB US NUCHAL MEAS 1 GEST: CPT

## 2023-04-24 PROCEDURE — 76801 OB US < 14 WKS SINGLE FETUS: CPT | Mod: 59

## 2023-05-11 ENCOUNTER — TRANSCRIPTION ENCOUNTER (OUTPATIENT)
Age: 28
End: 2023-05-11

## 2023-05-16 ENCOUNTER — NON-APPOINTMENT (OUTPATIENT)
Age: 28
End: 2023-05-16

## 2023-05-16 ENCOUNTER — APPOINTMENT (OUTPATIENT)
Dept: OBGYN | Facility: CLINIC | Age: 28
End: 2023-05-16
Payer: COMMERCIAL

## 2023-05-16 DIAGNOSIS — Z87.59 PERSONAL HISTORY OF OTHER COMPLICATIONS OF PREGNANCY, CHILDBIRTH AND THE PUERPERIUM: ICD-10-CM

## 2023-05-16 DIAGNOSIS — B37.31 ACUTE CANDIDIASIS OF VULVA AND VAGINA: ICD-10-CM

## 2023-05-16 PROCEDURE — 99213 OFFICE O/P EST LOW 20 MIN: CPT

## 2023-05-16 RX ORDER — TERCONAZOLE 8 MG/G
0.8 CREAM VAGINAL
Qty: 1 | Refills: 2 | Status: ACTIVE | COMMUNITY
Start: 2023-05-16 | End: 1900-01-01

## 2023-05-17 LAB
C TRACH RRNA SPEC QL NAA+PROBE: NOT DETECTED
CANDIDA VAG CYTO: NOT DETECTED
G VAGINALIS+PREV SP MTYP VAG QL MICRO: DETECTED
N GONORRHOEA RRNA SPEC QL NAA+PROBE: NOT DETECTED
SOURCE AMPLIFICATION: NORMAL
T VAGINALIS VAG QL WET PREP: NOT DETECTED

## 2023-05-17 RX ORDER — METRONIDAZOLE 7.5 MG/G
0.75 GEL VAGINAL
Qty: 1 | Refills: 0 | Status: ACTIVE | COMMUNITY
Start: 2023-05-17 | End: 1900-01-01

## 2023-05-22 ENCOUNTER — ASOB RESULT (OUTPATIENT)
Age: 28
End: 2023-05-22

## 2023-05-22 ENCOUNTER — APPOINTMENT (OUTPATIENT)
Dept: ANTEPARTUM | Facility: CLINIC | Age: 28
End: 2023-05-22
Payer: COMMERCIAL

## 2023-05-22 LAB
AFP MOM: 0.95
AFP VALUE: 29.6 NG/ML
ALPHA FETOPROTEIN SERUM COMMENT: NORMAL
ALPHA FETOPROTEIN SERUM INTERPRETATION: NORMAL
ALPHA FETOPROTEIN SERUM RESULTS: NORMAL
ALPHA FETOPROTEIN SERUM TEST RESULTS: NORMAL
GESTATIONAL AGE BASED ON: NORMAL
GESTATIONAL AGE ON COLLECTION DATE: 15.7 WEEKS
INSULIN DEP DIABETES: NO
MATERNAL AGE AT EDD AFP: 30 YR
MULTIPLE GESTATION: NO
OSBR RISK 1 IN: NORMAL
RACE: NORMAL
WEIGHT AFP: 165 LBS

## 2023-05-22 PROCEDURE — 76805 OB US >/= 14 WKS SNGL FETUS: CPT

## 2023-06-12 ENCOUNTER — APPOINTMENT (OUTPATIENT)
Dept: ANTEPARTUM | Facility: CLINIC | Age: 28
End: 2023-06-12
Payer: COMMERCIAL

## 2023-06-12 ENCOUNTER — ASOB RESULT (OUTPATIENT)
Age: 28
End: 2023-06-12

## 2023-06-12 PROCEDURE — 76811 OB US DETAILED SNGL FETUS: CPT

## 2023-07-11 ENCOUNTER — NON-APPOINTMENT (OUTPATIENT)
Age: 28
End: 2023-07-11

## 2023-07-11 ENCOUNTER — APPOINTMENT (OUTPATIENT)
Dept: OBGYN | Facility: CLINIC | Age: 28
End: 2023-07-11
Payer: COMMERCIAL

## 2023-07-11 VITALS
SYSTOLIC BLOOD PRESSURE: 114 MMHG | WEIGHT: 183.25 LBS | BODY MASS INDEX: 31.28 KG/M2 | DIASTOLIC BLOOD PRESSURE: 72 MMHG | HEIGHT: 64 IN

## 2023-07-11 DIAGNOSIS — O36.8120 DECREASED FETAL MOVEMENTS, SECOND TRIMESTER, NOT APPLICABLE OR UNSPECIFIED: ICD-10-CM

## 2023-07-11 PROCEDURE — 99213 OFFICE O/P EST LOW 20 MIN: CPT

## 2023-07-12 LAB
GLUCOSE 1H P 50 G GLC PO SERPL-MCNC: 93 MG/DL
HIV1+2 AB SPEC QL IA.RAPID: NONREACTIVE
T PALLIDUM AB SER QL IA: NEGATIVE

## 2023-08-03 ENCOUNTER — NON-APPOINTMENT (OUTPATIENT)
Age: 28
End: 2023-08-03

## 2023-08-30 ENCOUNTER — APPOINTMENT (OUTPATIENT)
Dept: OBGYN | Facility: CLINIC | Age: 28
End: 2023-08-30
Payer: COMMERCIAL

## 2023-08-30 VITALS
BODY MASS INDEX: 32.84 KG/M2 | SYSTOLIC BLOOD PRESSURE: 108 MMHG | DIASTOLIC BLOOD PRESSURE: 71 MMHG | HEIGHT: 64 IN | WEIGHT: 192.38 LBS

## 2023-08-30 PROCEDURE — 99213 OFFICE O/P EST LOW 20 MIN: CPT | Mod: 25

## 2023-09-13 ENCOUNTER — APPOINTMENT (OUTPATIENT)
Dept: OBGYN | Facility: CLINIC | Age: 28
End: 2023-09-13
Payer: COMMERCIAL

## 2023-09-13 ENCOUNTER — NON-APPOINTMENT (OUTPATIENT)
Age: 28
End: 2023-09-13

## 2023-09-13 VITALS
DIASTOLIC BLOOD PRESSURE: 75 MMHG | HEIGHT: 64 IN | SYSTOLIC BLOOD PRESSURE: 112 MMHG | WEIGHT: 192.5 LBS | BODY MASS INDEX: 32.87 KG/M2

## 2023-09-13 DIAGNOSIS — Z23 ENCOUNTER FOR IMMUNIZATION: ICD-10-CM

## 2023-09-13 PROCEDURE — 90471 IMMUNIZATION ADMIN: CPT

## 2023-09-13 PROCEDURE — 99213 OFFICE O/P EST LOW 20 MIN: CPT | Mod: 25

## 2023-09-13 PROCEDURE — 90686 IIV4 VACC NO PRSV 0.5 ML IM: CPT

## 2023-09-20 ENCOUNTER — ASOB RESULT (OUTPATIENT)
Age: 28
End: 2023-09-20

## 2023-09-20 ENCOUNTER — APPOINTMENT (OUTPATIENT)
Dept: ANTEPARTUM | Facility: CLINIC | Age: 28
End: 2023-09-20
Payer: COMMERCIAL

## 2023-09-20 PROCEDURE — 76819 FETAL BIOPHYS PROFIL W/O NST: CPT | Mod: 59

## 2023-09-20 PROCEDURE — 76816 OB US FOLLOW-UP PER FETUS: CPT

## 2023-10-03 ENCOUNTER — NON-APPOINTMENT (OUTPATIENT)
Age: 28
End: 2023-10-03

## 2023-10-04 ENCOUNTER — APPOINTMENT (OUTPATIENT)
Dept: OBGYN | Facility: CLINIC | Age: 28
End: 2023-10-04
Payer: COMMERCIAL

## 2023-10-04 VITALS
WEIGHT: 200 LBS | BODY MASS INDEX: 34.15 KG/M2 | DIASTOLIC BLOOD PRESSURE: 74 MMHG | HEIGHT: 64 IN | SYSTOLIC BLOOD PRESSURE: 113 MMHG

## 2023-10-04 PROCEDURE — 99213 OFFICE O/P EST LOW 20 MIN: CPT | Mod: 25

## 2023-10-11 ENCOUNTER — APPOINTMENT (OUTPATIENT)
Dept: OBGYN | Facility: CLINIC | Age: 28
End: 2023-10-11
Payer: COMMERCIAL

## 2023-10-11 PROCEDURE — 99213 OFFICE O/P EST LOW 20 MIN: CPT | Mod: 25

## 2023-10-13 LAB
GP B STREP DNA SPEC QL NAA+PROBE: NOT DETECTED
SOURCE GBS: NORMAL

## 2023-10-17 ENCOUNTER — NON-APPOINTMENT (OUTPATIENT)
Age: 28
End: 2023-10-17

## 2023-10-18 ENCOUNTER — APPOINTMENT (OUTPATIENT)
Dept: OBGYN | Facility: CLINIC | Age: 28
End: 2023-10-18
Payer: COMMERCIAL

## 2023-10-18 VITALS
BODY MASS INDEX: 35.68 KG/M2 | HEIGHT: 64 IN | WEIGHT: 209 LBS | DIASTOLIC BLOOD PRESSURE: 68 MMHG | SYSTOLIC BLOOD PRESSURE: 120 MMHG

## 2023-10-18 PROCEDURE — 99213 OFFICE O/P EST LOW 20 MIN: CPT | Mod: 25

## 2023-10-19 NOTE — PATIENT PROFILE OB - EDD BY ULTRASOUND, OB PROFILE
02-Sep-2017 Cyclophosphamide Pregnancy And Lactation Text: This medication is Pregnancy Category D and it isn't considered safe during pregnancy. This medication is excreted in breast milk.

## 2023-10-25 ENCOUNTER — APPOINTMENT (OUTPATIENT)
Dept: OBGYN | Facility: CLINIC | Age: 28
End: 2023-10-25
Payer: COMMERCIAL

## 2023-10-25 VITALS
SYSTOLIC BLOOD PRESSURE: 110 MMHG | BODY MASS INDEX: 35.59 KG/M2 | WEIGHT: 208.5 LBS | HEIGHT: 64 IN | DIASTOLIC BLOOD PRESSURE: 68 MMHG

## 2023-10-25 PROCEDURE — 99213 OFFICE O/P EST LOW 20 MIN: CPT | Mod: 25

## 2023-11-01 ENCOUNTER — APPOINTMENT (OUTPATIENT)
Dept: OBGYN | Facility: CLINIC | Age: 28
End: 2023-11-01
Payer: COMMERCIAL

## 2023-11-01 VITALS
BODY MASS INDEX: 36.02 KG/M2 | WEIGHT: 211 LBS | SYSTOLIC BLOOD PRESSURE: 132 MMHG | DIASTOLIC BLOOD PRESSURE: 82 MMHG | HEIGHT: 64 IN

## 2023-11-01 DIAGNOSIS — Z34.93 ENCOUNTER FOR SUPERVISION OF NORMAL PREGNANCY, UNSPECIFIED, THIRD TRIMESTER: ICD-10-CM

## 2023-11-01 DIAGNOSIS — Z34.90 ENCOUNTER FOR SUPERVISION OF NORMAL PREGNANCY, UNSPECIFIED, UNSPECIFIED TRIMESTER: ICD-10-CM

## 2023-11-01 PROCEDURE — 99213 OFFICE O/P EST LOW 20 MIN: CPT | Mod: 25

## 2023-11-05 ENCOUNTER — INPATIENT (INPATIENT)
Facility: HOSPITAL | Age: 28
LOS: 1 days | Discharge: HOME | End: 2023-11-07
Attending: OBSTETRICS & GYNECOLOGY | Admitting: OBSTETRICS & GYNECOLOGY
Payer: COMMERCIAL

## 2023-11-05 VITALS — HEART RATE: 73 BPM | OXYGEN SATURATION: 100 %

## 2023-11-05 DIAGNOSIS — N83.20 UNSPECIFIED OVARIAN CYSTS: Chronic | ICD-10-CM

## 2023-11-05 DIAGNOSIS — O26.899 OTHER SPECIFIED PREGNANCY RELATED CONDITIONS, UNSPECIFIED TRIMESTER: ICD-10-CM

## 2023-11-05 DIAGNOSIS — Z34.80 ENCOUNTER FOR SUPERVISION OF OTHER NORMAL PREGNANCY, UNSPECIFIED TRIMESTER: ICD-10-CM

## 2023-11-05 DIAGNOSIS — N75.0 CYST OF BARTHOLIN'S GLAND: Chronic | ICD-10-CM

## 2023-11-05 LAB
HCT VFR BLD CALC: 29.8 % — LOW (ref 34.5–45)
HCT VFR BLD CALC: 29.8 % — LOW (ref 34.5–45)
HGB BLD-MCNC: 8.9 G/DL — LOW (ref 11.5–15.5)
HGB BLD-MCNC: 8.9 G/DL — LOW (ref 11.5–15.5)
MCHC RBC-ENTMCNC: 22.3 PG — LOW (ref 27–34)
MCHC RBC-ENTMCNC: 22.3 PG — LOW (ref 27–34)
MCHC RBC-ENTMCNC: 29.9 GM/DL — LOW (ref 32–36)
MCHC RBC-ENTMCNC: 29.9 GM/DL — LOW (ref 32–36)
MCV RBC AUTO: 74.7 FL — LOW (ref 80–100)
MCV RBC AUTO: 74.7 FL — LOW (ref 80–100)
PLATELET # BLD AUTO: 215 K/UL — SIGNIFICANT CHANGE UP (ref 150–400)
PLATELET # BLD AUTO: 215 K/UL — SIGNIFICANT CHANGE UP (ref 150–400)
RBC # BLD: 3.99 M/UL — SIGNIFICANT CHANGE UP (ref 3.8–5.2)
RBC # BLD: 3.99 M/UL — SIGNIFICANT CHANGE UP (ref 3.8–5.2)
RBC # FLD: 18.8 % — HIGH (ref 10.3–14.5)
RBC # FLD: 18.8 % — HIGH (ref 10.3–14.5)
WBC # BLD: 14.94 K/UL — HIGH (ref 3.8–10.5)
WBC # BLD: 14.94 K/UL — HIGH (ref 3.8–10.5)
WBC # FLD AUTO: 14.94 K/UL — HIGH (ref 3.8–10.5)
WBC # FLD AUTO: 14.94 K/UL — HIGH (ref 3.8–10.5)

## 2023-11-05 PROCEDURE — 59409 OBSTETRICAL CARE: CPT | Mod: U9

## 2023-11-05 RX ORDER — CHLORHEXIDINE GLUCONATE 213 G/1000ML
1 SOLUTION TOPICAL DAILY
Refills: 0 | Status: DISCONTINUED | OUTPATIENT
Start: 2023-11-05 | End: 2023-11-06

## 2023-11-05 RX ORDER — OXYTOCIN 10 UNIT/ML
333.33 VIAL (ML) INJECTION
Qty: 20 | Refills: 0 | Status: DISCONTINUED | OUTPATIENT
Start: 2023-11-05 | End: 2023-11-07

## 2023-11-05 RX ORDER — SODIUM CHLORIDE 9 MG/ML
1000 INJECTION, SOLUTION INTRAVENOUS
Refills: 0 | Status: DISCONTINUED | OUTPATIENT
Start: 2023-11-05 | End: 2023-11-06

## 2023-11-05 RX ORDER — CITRIC ACID/SODIUM CITRATE 300-500 MG
15 SOLUTION, ORAL ORAL EVERY 6 HOURS
Refills: 0 | Status: DISCONTINUED | OUTPATIENT
Start: 2023-11-05 | End: 2023-11-06

## 2023-11-05 RX ORDER — OXYTOCIN 10 UNIT/ML
4 VIAL (ML) INJECTION
Qty: 30 | Refills: 0 | Status: DISCONTINUED | OUTPATIENT
Start: 2023-11-05 | End: 2023-11-07

## 2023-11-05 NOTE — OB PROVIDER H&P - HISTORY OF PRESENT ILLNESS
27yo  at 40+3 presenting in early labor with painful contractions requesting an epidural. Patient also reports rupture of membranes en route to hospial at approx 1045p.     PNC: unc  GBS: neg  EFW: 2280 at 34w (extrap 3600)     ObHx:   - 2017 FT  (IOL for LT) 6#7, no complications in pregnancy  GynHx: denies  MedHx: denies  SrgHx: denies  PsychHx: denies  SocialHx: denies  AllergyHx: "antibiotic for strep throat" - ?Duricef  RxHx: denies

## 2023-11-05 NOTE — OB RN PATIENT PROFILE - NSICDXPASTMEDICALHX_GEN_ALL_CORE_FT
PAST MEDICAL HISTORY:  Gastritis      (normal spontaneous vaginal delivery)     UTI (urinary tract infection)

## 2023-11-05 NOTE — OB PROVIDER H&P - NSHPPHYSICALEXAM_GEN_ALL_CORE
ICU Vital Signs Last 24 Hrs  T(C): 36.7 (05 Nov 2023 22:38), Max: 36.7 (05 Nov 2023 22:38)  T(F): 98 (05 Nov 2023 22:38), Max: 98 (05 Nov 2023 22:38)  HR: 76 (05 Nov 2023 23:22) (70 - 93)  BP: 132/76 (05 Nov 2023 23:16) (127/83 - 132/76)  BP(mean): --  ABP: --  ABP(mean): --  RR: 18 (05 Nov 2023 22:38) (18 - 18)  SpO2: 100% (05 Nov 2023 23:22) (98% - 100%)    Gen: NAD  Abd: Soft, nontender  Ext: No edema, no erythema     SVE 3/80/-3, thick meconium noted, grossly ruptured membranes

## 2023-11-05 NOTE — OB PROVIDER H&P - ATTENDING COMMENTS
27 y/o  @40w3d presents with SROM in early labor    for epidural  pitocin   check in 2 hrs    anticipate      Irina Harvey MD

## 2023-11-05 NOTE — OB PROVIDER H&P - ASSESSMENT
27yo  at 40+3 presenting in early labor s/p SROM at 1045pm to be admitted to L&D for delivery.     - Admit to L&D  - Routine labs (& HELLP labs)   - EFM & toco   - CLD & IVF     - GBS neg         - Call for epidural   - Repeat SVE in 2 hrs; consider augmentation at that time    D/w Wes Ray, PGY3

## 2023-11-05 NOTE — OB RN PATIENT PROFILE - BMI (KG/M2)
Gastroenterology Consult Note     Patient: Sylvia Lamb Date: 11/30/2017   YOB: 1945 Admission Date: 11/30/2017   MRN: [id number] Attending: Rigo Hutchins MD     Current hospital day:  Hospital Day: 1    Consulting provider:  Jessee Alston MD and Madison Panchal MD     Consulted for:  Hematemesis at the request of Rigo Hutchins MD.    HISTORY OF PRESENT ILLNESS:    The patient is a 72 year old female who was admitted with Hematemesis, presence of nausea not specified [K92.0] and is being seen by Gastroenterology at the request of Rigo Hutchins MD for hematemesis. Past medical history significant for hypertension, coronary artery, diabetes, recent hip fracture status post repair on anticoagulation with warfarin.    Patient was transferred from nursing home for episodes of hematemesis. Patient has been having nausea for the last couple of days and has been receiving morphine for pain control. She has had 4 episodes of vomiting with coffee ground material and also bright red blood. She denies any abdominal pain, melena, hematochezia. No other concerns of fever, diarrhea, dysphagia, odynophagia.  She complains of pain in bilateral lower extremities for which she has been receiving morphine. She has nausea associated with morphine use. She denies NSAIDs use, smoking, alcohol consumption.    Patient was recently discharged 2 days ago. She had a right femoral neck fracture however she needed cardiac clearance prior to surgery and she received a cardiac cath. There were no indications for endovascular therapies seen at that time. She did have issues with lethargy in the hospital which were attributed to MS Contin use. This was discontinued. She was started on Warfarin for prophylaxis by orthopedics.     PAST MEDICAL HISTORY:    Diabetes mellitus (CMS/HCC)                                   Coronary artery disease                                       Essential (primary) hypertension                               PVD (peripheral vascular disease) (CMS/HCC)                   DJD (degenerative joint disease), lumbosacral                   Comment: L5-S1; chronic low back pain    Tobacco use                                                   Dyslipidemia                                                  Osteopenia                                                    COPD (chronic obstructive pulmonary disease) (*               Cerebral infarction (CMS/HCC)                                 PAST SURGICAL HISTORY:    PTCA WITH STENT                                 2010            Comment: 4-5 stents     COLONOSCOPY W/ POLYPECTOMY                      10/14/201*      Comment: adenoma & hyperplastic polyps;repeat 3                yrs;04/2018    LAPAROSCOPY,TUBAL CAUTERY                                       Comment: Tubal Ligation    Current Facility-Administered Medications   Medication   • pantoprazole (PROTONIX) 80 mg in sodium chloride 0.9 % 100 mL infusion   • hydrALAZINE (APRESOLINE) injection 10 mg   • phytonadione (vitamin K) (AQUA-MEPHYTON) 2 mg in sodium chloride 0.9 % 50 mL IVPB       ALLERGIES:  No Known Allergies     Family History   Problem Relation Age of Onset   • Heart disease Mother    • Cancer Father    • Arthritis Sister    • Cancer Sister      Colon cancer       Social History     Social History   • Marital status:      Spouse name: N/A   • Number of children: N/A   • Years of education: N/A     Occupational History   • Not on file.     Social History Main Topics   • Smoking status: Current Every Day Smoker     Packs/day: 1.00     Years: 40.00     Types: Cigarettes   • Smokeless tobacco: Never Used      Comment: QUIT LINE INFO GIVEN   • Alcohol use No   • Drug use: No   • Sexual activity: Not on file     Other Topics Concern   • Exercise Yes     Social History Narrative   • No narrative on file          REVIEW OF SYSTEMS:  A complete review of systems was performed and found to be negative except for what was  stated in the HPI (History of Present Illness).    PHYSICAL EXAM:  Visit Vitals  /84 (BP Location: E, Patient Position: Semi-Gerardo's)   Pulse 78   Temp 98.2 °F (36.8 °C) (Oral)   Resp 18   Ht 5' 4\" (1.626 m)   Wt 60.8 kg   SpO2 96%   BMI 23.00 kg/m²     General: lying comfortably, no acute distress  Head: Atraumatic, normocephalic  Eyes: No scleral icterus, Pupils equal and reactive to light  ENT: Oral mucosa is moist and clear  Skin: Exposed areas appear grossly normal. No jaundice  Cardiovascular: RRR (regular rate and rhythm), no murmur  Lungs: Normal breath sounds bilaterally  Abdomen: Soft, nondistended, no tenderness to palpation, normal bowel sounds  Extremities: No pedal edema, rashes or ulcers  Neurologic: No asterixis, non-focal neurologic exam      LABORATORY DATA  Lab Results   Component Value Date    SODIUM 141 11/30/2017    POTASSIUM 4.1 11/30/2017    CHLORIDE 102 11/30/2017    CO2 32 11/30/2017    GLUCOSE 285 (H) 11/30/2017    BUN 27 (H) 11/30/2017    CREATININE 1.03 (H) 11/30/2017    GAGANDEEP 1.23 03/02/2013    ALBUMIN 2.0 (L) 11/30/2017    BILIRUBIN 0.4 11/30/2017    AST 28 11/30/2017    INR 1.7 11/30/2017     Lab Results   Component Value Date    PTT 34 (H) 11/30/2017    WBC 14.9 (H) 11/30/2017    HGB 8.5 (L) 11/30/2017    HCT 25.7 (L) 11/30/2017     (H) 11/30/2017     (H) 11/30/2017    RAPDTR <0.02 11/30/2017    TSH 5.502 (H) 11/27/2017         IMAGING  XR Chest AP or PA   Final Result   IMPRESSION:      No acute cardiopulmonary process.      I have reviewed the images and agree with the Resident interpretation.               ASSESSMENT AND RECOMMENDATIONS:  72 year old female with past medical history significant for hypertension, coronary artery, diabetes, recent hip fracture status post repair on anticoagulation with warfarin presenting with hematemesis.    1. Hematemesis:  - Differentials include Latisha-Rafita tear, esophagitis, gastritis, peptic ulcer disease.  - Hgb 8.5  today. INR 1.7. Vitals stable.  - On anticoagulation post hip surgery  - H/O nausea associated with narcotic use.    2. CAD:  - No indication for intervention in the most recent angiogram    3. Other medical issues: Diabetes, COPD, PVD    Plan:  - Continue to monitor hemoglobin and hematocrit, vitals and for recurrence of bleeding.  - Continue IV PPI drip.  - Transfuse as needed to keep hemoglobin more than 7.  - Antiemetics as needed for nausea.  - Upper GI endoscopy for further evaluation of hematemesis.  - Further recommendations based on the endoscopic evaluation.    Thank you for involving us in your patient's care and we will follow along with you.    Patient discussed with Dr. Jessee Alston.    Madison Panchal MD  Gastroenterology Fellow  11/30/2017  [phone]        Attending addendum -  I was present with the Fellow during the history and physical exam. I discussed the case with the Fellow, and agree with the findings and plan as documented in the Fellow's note.  Acute onset hematemesis concern for Latisha-mik tear keep NPO/PPI IV/Correct coagulopathy and emergent EGD to evaluate as noted  Jessee Altson MD     35

## 2023-11-05 NOTE — OB RN PATIENT PROFILE - NS_NUMBOFVISITS_OBGYN_ALL_OB_NU
05/30/18        To:  Mar Garcia   [address]  [facility] [address]        Dear Mar,    Thank you for choosing [facility] for your care.  During your clinic visit a Pap smear was performed.  I am happy to inform you the tests results have been reviewed and interpreted as normal.  There were no abnormalities reported and no evidence of cancer.      If you have any questions or concerns feel free to contact our office.  Your health is our primary concern.    Sincerely,      Padma Rawls MD     [facility]  [address] [place] [address] [phone]      
20

## 2023-11-05 NOTE — OB RN PATIENT PROFILE - FUNCTIONAL ASSESSMENT - BASIC MOBILITY 6.
4-calculated by average /Not able to assess (calculate score using [facility] averaging method)

## 2023-11-06 PROBLEM — Z34.93 ENCOUNTER FOR SUPERVISION OF LOW-RISK PREGNANCY IN THIRD TRIMESTER: Status: RESOLVED | Noted: 2023-07-11 | Resolved: 2023-11-06

## 2023-11-06 LAB
ANISOCYTOSIS BLD QL: SIGNIFICANT CHANGE UP
ANISOCYTOSIS BLD QL: SIGNIFICANT CHANGE UP
BASOPHILS # BLD AUTO: 0 K/UL — SIGNIFICANT CHANGE UP (ref 0–0.2)
BASOPHILS # BLD AUTO: 0 K/UL — SIGNIFICANT CHANGE UP (ref 0–0.2)
BASOPHILS NFR BLD AUTO: 0 % — SIGNIFICANT CHANGE UP (ref 0–2)
BASOPHILS NFR BLD AUTO: 0 % — SIGNIFICANT CHANGE UP (ref 0–2)
BLD GP AB SCN SERPL QL: NEGATIVE — SIGNIFICANT CHANGE UP
BLD GP AB SCN SERPL QL: NEGATIVE — SIGNIFICANT CHANGE UP
DACRYOCYTES BLD QL SMEAR: SLIGHT — SIGNIFICANT CHANGE UP
DACRYOCYTES BLD QL SMEAR: SLIGHT — SIGNIFICANT CHANGE UP
ELLIPTOCYTES BLD QL SMEAR: SLIGHT — SIGNIFICANT CHANGE UP
ELLIPTOCYTES BLD QL SMEAR: SLIGHT — SIGNIFICANT CHANGE UP
EOSINOPHIL # BLD AUTO: 0.39 K/UL — SIGNIFICANT CHANGE UP (ref 0–0.5)
EOSINOPHIL # BLD AUTO: 0.39 K/UL — SIGNIFICANT CHANGE UP (ref 0–0.5)
EOSINOPHIL NFR BLD AUTO: 2.6 % — SIGNIFICANT CHANGE UP (ref 0–6)
EOSINOPHIL NFR BLD AUTO: 2.6 % — SIGNIFICANT CHANGE UP (ref 0–6)
GIANT PLATELETS BLD QL SMEAR: PRESENT — SIGNIFICANT CHANGE UP
GIANT PLATELETS BLD QL SMEAR: PRESENT — SIGNIFICANT CHANGE UP
LYMPHOCYTES # BLD AUTO: 13.9 % — SIGNIFICANT CHANGE UP (ref 13–44)
LYMPHOCYTES # BLD AUTO: 13.9 % — SIGNIFICANT CHANGE UP (ref 13–44)
LYMPHOCYTES # BLD AUTO: 2.08 K/UL — SIGNIFICANT CHANGE UP (ref 1–3.3)
LYMPHOCYTES # BLD AUTO: 2.08 K/UL — SIGNIFICANT CHANGE UP (ref 1–3.3)
MACROCYTES BLD QL: SLIGHT — SIGNIFICANT CHANGE UP
MACROCYTES BLD QL: SLIGHT — SIGNIFICANT CHANGE UP
MANUAL SMEAR VERIFICATION: SIGNIFICANT CHANGE UP
MANUAL SMEAR VERIFICATION: SIGNIFICANT CHANGE UP
MICROCYTES BLD QL: SLIGHT — SIGNIFICANT CHANGE UP
MICROCYTES BLD QL: SLIGHT — SIGNIFICANT CHANGE UP
MONOCYTES # BLD AUTO: 0.39 K/UL — SIGNIFICANT CHANGE UP (ref 0–0.9)
MONOCYTES # BLD AUTO: 0.39 K/UL — SIGNIFICANT CHANGE UP (ref 0–0.9)
MONOCYTES NFR BLD AUTO: 2.6 % — SIGNIFICANT CHANGE UP (ref 2–14)
MONOCYTES NFR BLD AUTO: 2.6 % — SIGNIFICANT CHANGE UP (ref 2–14)
MYELOCYTES NFR BLD: 0.9 % — HIGH (ref 0–0)
MYELOCYTES NFR BLD: 0.9 % — HIGH (ref 0–0)
NEUTROPHILS # BLD AUTO: 11.95 K/UL — HIGH (ref 1.8–7.4)
NEUTROPHILS # BLD AUTO: 11.95 K/UL — HIGH (ref 1.8–7.4)
NEUTROPHILS NFR BLD AUTO: 80 % — HIGH (ref 43–77)
NEUTROPHILS NFR BLD AUTO: 80 % — HIGH (ref 43–77)
PLAT MORPH BLD: NORMAL — SIGNIFICANT CHANGE UP
PLAT MORPH BLD: NORMAL — SIGNIFICANT CHANGE UP
POIKILOCYTOSIS BLD QL AUTO: SLIGHT — SIGNIFICANT CHANGE UP
POIKILOCYTOSIS BLD QL AUTO: SLIGHT — SIGNIFICANT CHANGE UP
POLYCHROMASIA BLD QL SMEAR: SLIGHT — SIGNIFICANT CHANGE UP
POLYCHROMASIA BLD QL SMEAR: SLIGHT — SIGNIFICANT CHANGE UP
RBC BLD AUTO: ABNORMAL
RBC BLD AUTO: ABNORMAL
RH IG SCN BLD-IMP: POSITIVE — SIGNIFICANT CHANGE UP
RH IG SCN BLD-IMP: POSITIVE — SIGNIFICANT CHANGE UP
T PALLIDUM AB TITR SER: NEGATIVE — SIGNIFICANT CHANGE UP
T PALLIDUM AB TITR SER: NEGATIVE — SIGNIFICANT CHANGE UP
TARGETS BLD QL SMEAR: SLIGHT — SIGNIFICANT CHANGE UP
TARGETS BLD QL SMEAR: SLIGHT — SIGNIFICANT CHANGE UP

## 2023-11-06 RX ORDER — DIPHENHYDRAMINE HCL 50 MG
25 CAPSULE ORAL EVERY 6 HOURS
Refills: 0 | Status: DISCONTINUED | OUTPATIENT
Start: 2023-11-06 | End: 2023-11-07

## 2023-11-06 RX ORDER — OXYTOCIN 10 UNIT/ML
10 VIAL (ML) INJECTION ONCE
Refills: 0 | Status: DISCONTINUED | OUTPATIENT
Start: 2023-11-06 | End: 2023-11-07

## 2023-11-06 RX ORDER — MAGNESIUM HYDROXIDE 400 MG/1
30 TABLET, CHEWABLE ORAL
Refills: 0 | Status: DISCONTINUED | OUTPATIENT
Start: 2023-11-06 | End: 2023-11-07

## 2023-11-06 RX ORDER — IBUPROFEN 200 MG
600 TABLET ORAL EVERY 6 HOURS
Refills: 0 | Status: DISCONTINUED | OUTPATIENT
Start: 2023-11-06 | End: 2023-11-07

## 2023-11-06 RX ORDER — OXYCODONE HYDROCHLORIDE 5 MG/1
5 TABLET ORAL ONCE
Refills: 0 | Status: DISCONTINUED | OUTPATIENT
Start: 2023-11-06 | End: 2023-11-07

## 2023-11-06 RX ORDER — BENZOCAINE 10 %
1 GEL (GRAM) MUCOUS MEMBRANE EVERY 6 HOURS
Refills: 0 | Status: DISCONTINUED | OUTPATIENT
Start: 2023-11-06 | End: 2023-11-07

## 2023-11-06 RX ORDER — PRAMOXINE HYDROCHLORIDE 150 MG/15G
1 AEROSOL, FOAM RECTAL EVERY 4 HOURS
Refills: 0 | Status: DISCONTINUED | OUTPATIENT
Start: 2023-11-06 | End: 2023-11-07

## 2023-11-06 RX ORDER — ACETAMINOPHEN 500 MG
975 TABLET ORAL
Refills: 0 | Status: DISCONTINUED | OUTPATIENT
Start: 2023-11-06 | End: 2023-11-07

## 2023-11-06 RX ORDER — KETOROLAC TROMETHAMINE 30 MG/ML
30 SYRINGE (ML) INJECTION ONCE
Refills: 0 | Status: DISCONTINUED | OUTPATIENT
Start: 2023-11-06 | End: 2023-11-07

## 2023-11-06 RX ORDER — SODIUM CHLORIDE 9 MG/ML
3 INJECTION INTRAMUSCULAR; INTRAVENOUS; SUBCUTANEOUS EVERY 8 HOURS
Refills: 0 | Status: DISCONTINUED | OUTPATIENT
Start: 2023-11-06 | End: 2023-11-07

## 2023-11-06 RX ORDER — AER TRAVELER 0.5 G/1
1 SOLUTION RECTAL; TOPICAL EVERY 4 HOURS
Refills: 0 | Status: DISCONTINUED | OUTPATIENT
Start: 2023-11-06 | End: 2023-11-07

## 2023-11-06 RX ORDER — OXYTOCIN 10 UNIT/ML
41.67 VIAL (ML) INJECTION
Qty: 20 | Refills: 0 | Status: DISCONTINUED | OUTPATIENT
Start: 2023-11-06 | End: 2023-11-07

## 2023-11-06 RX ORDER — TETANUS TOXOID, REDUCED DIPHTHERIA TOXOID AND ACELLULAR PERTUSSIS VACCINE, ADSORBED 5; 2.5; 8; 8; 2.5 [IU]/.5ML; [IU]/.5ML; UG/.5ML; UG/.5ML; UG/.5ML
0.5 SUSPENSION INTRAMUSCULAR ONCE
Refills: 0 | Status: DISCONTINUED | OUTPATIENT
Start: 2023-11-06 | End: 2023-11-07

## 2023-11-06 RX ORDER — OXYCODONE HYDROCHLORIDE 5 MG/1
5 TABLET ORAL
Refills: 0 | Status: DISCONTINUED | OUTPATIENT
Start: 2023-11-06 | End: 2023-11-07

## 2023-11-06 RX ORDER — SIMETHICONE 80 MG/1
80 TABLET, CHEWABLE ORAL EVERY 4 HOURS
Refills: 0 | Status: DISCONTINUED | OUTPATIENT
Start: 2023-11-06 | End: 2023-11-07

## 2023-11-06 RX ORDER — DIBUCAINE 1 %
1 OINTMENT (GRAM) RECTAL EVERY 6 HOURS
Refills: 0 | Status: DISCONTINUED | OUTPATIENT
Start: 2023-11-06 | End: 2023-11-07

## 2023-11-06 RX ORDER — HYDROCORTISONE 1 %
1 OINTMENT (GRAM) TOPICAL EVERY 6 HOURS
Refills: 0 | Status: DISCONTINUED | OUTPATIENT
Start: 2023-11-06 | End: 2023-11-07

## 2023-11-06 RX ORDER — IBUPROFEN 200 MG
600 TABLET ORAL EVERY 6 HOURS
Refills: 0 | Status: COMPLETED | OUTPATIENT
Start: 2023-11-06 | End: 2024-10-04

## 2023-11-06 RX ORDER — LANOLIN
1 OINTMENT (GRAM) TOPICAL EVERY 6 HOURS
Refills: 0 | Status: DISCONTINUED | OUTPATIENT
Start: 2023-11-06 | End: 2023-11-07

## 2023-11-06 RX ADMIN — Medication 1 TABLET(S): at 15:14

## 2023-11-06 RX ADMIN — SODIUM CHLORIDE 125 MILLILITER(S): 9 INJECTION, SOLUTION INTRAVENOUS at 01:02

## 2023-11-06 NOTE — OB PROVIDER DELIVERY SUMMARY - NSSELHIDDEN_OBGYN_ALL_OB_FT
[NS_DeliveryAttending1_OBGYN_ALL_OB_FT:[id number]],[NS_DeliveryAssist1_OBGYN_ALL_OB_FT:[id number]=],[NS_DeliveryAssist2_OBGYN_ALL_OB_FT:[id number]]

## 2023-11-06 NOTE — OB RN DELIVERY SUMMARY - NSSELHIDDEN_OBGYN_ALL_OB_FT
[NS_DeliveryAttending1_OBGYN_ALL_OB_FT:[id number]],[NS_DeliveryAssist1_OBGYN_ALL_OB_FT:[id number]],[NS_DeliveryAssist2_OBGYN_ALL_OB_FT:[id number]=],[NS_DeliveryRN_OBGYN_ALL_OB_FT:[id number]]

## 2023-11-06 NOTE — OB PROVIDER DELIVERY SUMMARY - NSPROVIDERDELIVERYNOTE_OBGYN_ALL_OB_FT
Pt fully and with urge to push    Delivery of a Viable male infant in the FLORESITA position. Head, shoulders and body delivered easily. Cord was clamped and cut and infant was passed over to pediatrics due to heavy meconium. Placenta delivered spontaneously and intact with a 3 vessel cord. Fundal massage was given and uterine fundus was found to be firm. Vaginal exam revealed an intact cervix, vaginal walls and sulci. No lacerations noted on exam. Excellent hemostasis was noted. Patient and infant in stable condition. Count was correct x 2.    EBL: 250ml  IM pitx1  Apgars 9,9    Service attending, Dr. Wolfe was present in the room at delivery

## 2023-11-06 NOTE — OB PROVIDER DELIVERY SUMMARY - OB VTE ASSESSMENT
Addended by: JESÚS TREVIZO on: 6/9/2023 11:57 AM     Modules accepted: Orders     <<--- Click to launch

## 2023-11-06 NOTE — OB NEONATOLOGY/PEDIATRICIAN DELIVERY SUMMARY - NSPEDSNEONOTESA_OBGYN_ALL_OB_FT
[facility] ACP called to LDR for category II tracing and meconium. As reported by delivery room nurse: 40.4 wk AGA male born via  on 2023 @0132 to a 27 y/o  mother. No significant maternal or prenatal history. Maternal labs include Blood Type A+, HIV - , RPR NR , Rubella I , Hep B - , GBS - 10/11/2023. SROM at 2240 with meconium fluids (ROM hours: 2H52M). Baby emerged vigorous, crying, was warmed, dried suctioned and stimulated with APGARS of 9/9. Resuscitation included: deep suctioning. Void x1. Mom plans to initiate breastfeeding, consents Hep B vaccine and declines circ.  Highest maternal temp: 37.1 C. EOS 0.11. Admitted under Dr. Howell. Outpatient PMD: Dr. Nancy Eli, [place], [place].      Physical Exam:  Gen: no acute distress, +grimace  HEENT:  anterior fontanel open soft and flat, nondysmorphic facies, no cleft lip/palate, ears normal set, no ear pits or tags, nares clinically patent  Resp: Normal respiratory effort without grunting or retractions, good air entry b/l, clear to auscultation bilaterally  Cardio: Present S1/S2, regular rate and rhythm, no murmurs  Abd: soft, non tender, non distended, umbilical cord with 3 vessels  Neuro: +palmar and plantar grasp, +suck, +[place], normal tone  Extremities/musculoskeletal: negative Hager and Ortolani maneuvers, moving all extremities, no clavicular crepitus or stepoff  Skin: pink, warm, +CDM to sacrum  Genitals: Normal male anatomy, testicles palpable in scrotum b/l, Stef 1, anus patent

## 2023-11-06 NOTE — OB RN DELIVERY SUMMARY - NS_SEPSISRSKCALC_OBGYN_ALL_OB_FT
EOS calculated successfully. EOS Risk Factor: 0.5/1000 live births ([facility] national incidence); GA=40w4d; Temp=98.4; ROM=2.867; GBS='Negative'; Antibiotics='No antibiotics or any antibiotics < 2 hrs prior to birth'

## 2023-11-06 NOTE — CHART NOTE - NSCHARTNOTEFT_GEN_A_CORE
/Attending:    Called in to the room of this 27y/o  @40.4wks who was admitted in labor as pt was fully dilated and bearing down.  Pt's Pvt MD (Dr. Harvey) called and was en route.  Pt continued to bear down and had an uncomplicated  (please see the delivery note).    Dr. Chandler

## 2023-11-07 ENCOUNTER — APPOINTMENT (OUTPATIENT)
Dept: ANTEPARTUM | Facility: CLINIC | Age: 28
End: 2023-11-07

## 2023-11-07 ENCOUNTER — TRANSCRIPTION ENCOUNTER (OUTPATIENT)
Age: 28
End: 2023-11-07

## 2023-11-07 ENCOUNTER — NON-APPOINTMENT (OUTPATIENT)
Age: 28
End: 2023-11-07

## 2023-11-07 VITALS
HEART RATE: 75 BPM | DIASTOLIC BLOOD PRESSURE: 79 MMHG | TEMPERATURE: 99 F | SYSTOLIC BLOOD PRESSURE: 121 MMHG | RESPIRATION RATE: 18 BRPM | OXYGEN SATURATION: 97 %

## 2023-11-07 PROCEDURE — 86901 BLOOD TYPING SEROLOGIC RH(D): CPT

## 2023-11-07 PROCEDURE — 86900 BLOOD TYPING SEROLOGIC ABO: CPT

## 2023-11-07 PROCEDURE — 86780 TREPONEMA PALLIDUM: CPT

## 2023-11-07 PROCEDURE — 59050 FETAL MONITOR W/REPORT: CPT

## 2023-11-07 PROCEDURE — 85025 COMPLETE CBC W/AUTO DIFF WBC: CPT

## 2023-11-07 PROCEDURE — 86850 RBC ANTIBODY SCREEN: CPT

## 2023-11-07 RX ORDER — SIMETHICONE 80 MG/1
1 TABLET, CHEWABLE ORAL
Qty: 0 | Refills: 0 | DISCHARGE
Start: 2023-11-07

## 2023-11-07 RX ORDER — ACETAMINOPHEN 500 MG
3 TABLET ORAL
Qty: 0 | Refills: 0 | DISCHARGE
Start: 2023-11-07

## 2023-11-07 RX ORDER — IBUPROFEN 200 MG
3 TABLET ORAL
Qty: 0 | Refills: 0 | DISCHARGE
Start: 2023-11-07

## 2023-11-07 RX ADMIN — Medication 1 TABLET(S): at 11:43

## 2023-11-07 NOTE — DISCHARGE NOTE OB - PATIENT PORTAL LINK FT
You can access the FollowMyHealth Patient Portal offered by [facility] by registering at the following website: [email or web address]. By joining [employer]’s FollowMyHealth portal, you will also be able to view your health information using other applications (apps) compatible with our system.

## 2023-11-07 NOTE — DISCHARGE NOTE OB - CARE PLAN
1 Principal Discharge DX:	Vaginal delivery  Assessment and plan of treatment:	followup in 2-6 weeks  call if headache, chest pain, short of breath or heavy vaginal bleeding

## 2023-11-07 NOTE — DISCHARGE NOTE OB - NS MD DC FALL RISK RISK
For information on Fall & Injury Prevention, visit: [email or web address] OR  [email or web address] OR  https://www.cdc.gov/steadi/patient.html

## 2023-11-07 NOTE — DISCHARGE NOTE OB - HOSPITAL COURSE
uncomplicated vaginal delivery with uncomplicated postpartum course  noted antepartum anemia treated w iron supplementation  stable for discharge home                          8.9    14.94 )-----------( 215      ( 05 Nov 2023 23:43 )             29.8

## 2023-11-07 NOTE — PROGRESS NOTE ADULT - SUBJECTIVE AND OBJECTIVE BOX
PA PPD#1  Note    Blood Type:  A+              Rubella:   Immune                 RPR:  NR  Pain:  Controlled  Complaints:  None  Pt. is OOB, voiding, passing flatus, & tolerating regular diet.  Lochia:  WNL    VS:  Vital Signs Last 24 Hrs  T(C): 37.3 (2023 06:22), Max: 37.4 (2023 13:19)  T(F): 99.1 (2023 06:22), Max: 99.3 (2023 13:19)  HR: 75 (2023 06:22) (65 - 89)  BP: 121/79 (2023 06:22) (116/75 - 121/79)  RR: 18 (2023 06:22) (18 - 18)  SpO2: 97% (2023 06:22) (96% - 98%)    Parameters below as of 2023 06:22  Patient On (Oxygen Delivery Method): room air                        8.9    14.94 )-----------( 215      ( 2023 23:43 )             29.8     Abd:  Soft, non-distended, non-tender            Fundus-firm  Extremities:  Edema - none                     Calf Tenderness - none    Assessment:    28 y.o. G 2 P       PPD # 1 S/P  in stable condition.  PMHx significant for:  None  Current Issues:  None  Plan:  Increase OOB             Cont. Pain Protocol             Cont. Reg. Diet             Cont. PP Malcolm.    SONYA Escobedo

## 2023-11-07 NOTE — DISCHARGE NOTE OB - MEDICATION SUMMARY - MEDICATIONS TO STOP TAKING
I will STOP taking the medications listed below when I get home from the hospital:    Macrobid 100 mg oral capsule  -- 1 cap(s) by mouth every 12 hours   -- Finish all this medication unless otherwise directed by prescriber.  May discolor urine or feces.  Take with food or milk.    Benadryl 25 mg oral tablet  -- 1 tab(s) by mouth every 6 hours if the pyridoxine-doxylamine is not providing symptoms relief.  -- May cause drowsiness.  Alcohol may intensify this effect.  Use care when operating dangerous machinery.  Obtain medical advice before taking any non-prescription drugs as some may affect the action of this medication.    promethazine 12.5 mg rectal suppository  -- 1 suppository(ies) rectally 2 times a day   -- For rectal use only.  Keep in refrigerator.  Do not freeze.  May cause drowsiness.  Alcohol may intensify this effect.  Use care when operating dangerous machinery.  Obtain medical advice before taking any non-prescription drugs as some may affect the action of this medication.    ondansetron 4 mg oral tablet, disintegrating  -- 1 tab(s) by mouth every 8 hours    doxylamine-pyridoxine 10 mg-10 mg oral delayed release tablet  -- 2 tab(s) by mouth once a day (at bedtime). If symptoms worsen you can take up to four (one tablet in the morning, one in afternoon, two at bedtime)  -- Do not chew, break, or crush.  Do not drink alcoholic beverages when taking this medication.  May cause drowsiness.  Alcohol may intensify this effect.  Use care when operating dangerous machinery.  Some non-prescription drugs may aggravate your condition.  Read all labels carefully.  If a warning appears, check with your doctor before taking.  Swallow whole.  Do not crush.  Take medication on an empty stomach 1 hour before or 2 to 3 hours after a meal unless otherwise directed by your doctor.  This drug may impair the ability to drive or operate machinery.  Use care until you become familiar with its effects.

## 2023-11-07 NOTE — PROGRESS NOTE ADULT - ATTENDING COMMENTS
Patient doing well, no complaints, out of bed.   No HA, CP, SOB  No heavy vaginal bleeding (VB)/normal lochia    ICU Vital Signs Last 24 Hrs  T(C): 37.3 (07 Nov 2023 06:22), Max: 37.4 (06 Nov 2023 13:19)  HR: 75 (07 Nov 2023 06:22) (67 - 89)  BP: 121/79 (07 Nov 2023 06:22) (118/71 - 121/79)  RR: 18 (07 Nov 2023 06:22) (18 - 18)  SpO2: 97% (07 Nov 2023 06:22) (96% - 98%)    O2 Parameters below as of 07 Nov 2023 06:22  Patient On (Oxygen Delivery Method): room air                          8.9    14.94 )-----------( 215      ( 05 Nov 2023 23:43 )             29.8       PPD # 2 stable  meeting all milestones for discharge home    Patient seen and evaluated by me. I agree with resident note unless otherwise stated.   Routine postpartum care, regular diet as tolerated, ambulate and pain control as needed.     BARRY Mejia MD  Attending

## 2023-11-07 NOTE — DISCHARGE NOTE OB - MEDICATION SUMMARY - MEDICATIONS TO TAKE
I will START or STAY ON the medications listed below when I get home from the hospital:    Advil 200 mg oral tablet  -- 3 tab(s) by mouth 4 times a day decrease number of tabs as pain improves  -- Indication: For  (normal spontaneous vaginal delivery)    acetaminophen 325 mg oral tablet  -- 3 tab(s) by mouth 4 times a day  -- Indication: For  (normal spontaneous vaginal delivery)    Prenatal Multivitamins with Folic Acid 1 mg oral tablet  -- 1 tab(s) by mouth once a day  -- Indication: For  (normal spontaneous vaginal delivery)    simethicone 80 mg oral tablet, chewable  -- 1 tab(s) by mouth every 4 hours As needed Gas  -- Indication: For  (normal spontaneous vaginal delivery)

## 2023-11-07 NOTE — DISCHARGE NOTE OB - CARE PROVIDER_API CALL
Cyndy Ventura  Obstetrics and Gynecology  [address], [address]  [place] [address]  Phone: [phone]  Fax: [phone]  Follow Up Time:

## 2024-10-02 NOTE — ED PROVIDER NOTE - RESPIRATORY NEGATIVE STATEMENT, MLM
Subjective   Patient ID: Brtet Wharton is a 14 y.o. male who presents for Headache (Woke up today with sx), Nasal Congestion, and Sore Throat.  Here with mom    Started today really not feeling well, but symptoms have been coming and going for the past month  Congestion, cough, sore throat; congestion has not resolved since it started  No fever  No known exposures  Feeling more tired than usual and has had some headache  Seems to be always congested  Takes zyrtec every day        Headache   Associated symptoms include coughing and a sore throat. Pertinent negatives include no fever.   Sore Throat   Associated symptoms include congestion, coughing and headaches. Pertinent negatives include no ear discharge.       Review of Systems   Constitutional:  Negative for activity change, appetite change and fever.   HENT:  Positive for congestion and sore throat. Negative for ear discharge.    Respiratory:  Positive for cough.    Neurological:  Positive for headaches.       Objective   Physical Exam  Vitals reviewed.   Constitutional:       Appearance: He is obese.   HENT:      Right Ear: Tympanic membrane normal.      Left Ear: Tympanic membrane normal.      Nose: Congestion (swollen mucosa) and rhinorrhea (purulent) present.      Mouth/Throat:      Pharynx: No oropharyngeal exudate or posterior oropharyngeal erythema.      Comments: Copious amounts of thick post nasal drainage  Eyes:      Conjunctiva/sclera: Conjunctivae normal.   Cardiovascular:      Rate and Rhythm: Normal rate and regular rhythm.      Heart sounds: Normal heart sounds.   Pulmonary:      Effort: Pulmonary effort is normal.      Breath sounds: Normal breath sounds.   Musculoskeletal:      Cervical back: Normal range of motion.   Skin:     General: Skin is warm and dry.   Neurological:      Mental Status: He is alert.         Assessment/Plan   Diagnoses and all orders for this visit:  Clinical sinusitis  -     amoxicillin-pot clavulanate (Augmentin)  875-125 mg tablet; Take 1 tablet by mouth 2 times a day for 14 days.  Begin augmentin as directed. Continue supportive treatment for symptoms, reviewed expected course.  Please call with additional questions or concerns.           FAUZIA Hogan 10/02/24 2:18 PM    no chest pain, no cough, and no shortness of breath.

## 2024-10-22 ENCOUNTER — NON-APPOINTMENT (OUTPATIENT)
Age: 29
End: 2024-10-22

## 2024-11-15 NOTE — PATIENT PROFILE ADULT - INTERNATIONAL TRAVEL
Patient Name: Sreekanth Burger   Visit Date: 11/15/2024   Patient ID: [id number]  Provider: Dhruv Espana MD    Sex: female  Location: [facility]   YOB: 2020  Location Address: [address] [place] [address], [address],?[place]?[address]    Primary Care Provider Temi Hinojosa APRN  Location Phone: [phone]    No

## 2024-12-30 ENCOUNTER — NON-APPOINTMENT (OUTPATIENT)
Age: 29
End: 2024-12-30

## 2025-07-22 NOTE — OB PROVIDER DELIVERY SUMMARY - NSLACERATION_OBGYN_ALL_OB
Spoke to patients daughter Dana she said the family has been calling to get Lyrica sent in for her mom for several weeks now. She was given this at the ER when her mom was dx with Xiphoid process pain. She was taken off the med but is having a lot of pain and they can not seem to get help with this. She would also like a referral placed to see a provider about the xiphoid process issue. Dana said they would like a referral to a Physiatrist or thoracic doctor she spelled the 2 words for me. I told her I would send this to the covering provider but I'm not sure these are the provider needed to treat the xiphoid process.   No